# Patient Record
Sex: MALE | NOT HISPANIC OR LATINO | Employment: FULL TIME | URBAN - METROPOLITAN AREA
[De-identification: names, ages, dates, MRNs, and addresses within clinical notes are randomized per-mention and may not be internally consistent; named-entity substitution may affect disease eponyms.]

---

## 2024-02-11 ENCOUNTER — HOSPITAL ENCOUNTER (INPATIENT)
Facility: CLINIC | Age: 24
LOS: 3 days | Discharge: HOME | DRG: 638 | End: 2024-02-14
Attending: INTERNAL MEDICINE | Admitting: STUDENT IN AN ORGANIZED HEALTH CARE EDUCATION/TRAINING PROGRAM
Payer: COMMERCIAL

## 2024-02-11 DIAGNOSIS — E10.10 DIABETIC KETOACIDOSIS WITHOUT COMA ASSOCIATED WITH TYPE 1 DIABETES MELLITUS (H): ICD-10-CM

## 2024-02-11 DIAGNOSIS — E10.10 TYPE 1 DIABETES MELLITUS WITH KETOACIDOSIS WITHOUT COMA (H): Primary | ICD-10-CM

## 2024-02-11 PROBLEM — E11.10 DKA (DIABETIC KETOACIDOSIS) (H): Status: ACTIVE | Noted: 2024-02-11

## 2024-02-11 LAB
ALBUMIN SERPL BCG-MCNC: 4.2 G/DL (ref 3.5–5.2)
ALP SERPL-CCNC: 107 U/L (ref 40–150)
ALT SERPL W P-5'-P-CCNC: 23 U/L (ref 0–70)
ANION GAP SERPL CALCULATED.3IONS-SCNC: 10 MMOL/L (ref 7–15)
ANION GAP SERPL CALCULATED.3IONS-SCNC: 10 MMOL/L (ref 7–15)
ANION GAP SERPL CALCULATED.3IONS-SCNC: 14 MMOL/L (ref 7–15)
ANION GAP SERPL CALCULATED.3IONS-SCNC: 24 MMOL/L (ref 7–15)
ANION GAP SERPL CALCULATED.3IONS-SCNC: 28 MMOL/L (ref 7–15)
ANION GAP SERPL CALCULATED.3IONS-SCNC: 9 MMOL/L (ref 7–15)
AST SERPL W P-5'-P-CCNC: 16 U/L (ref 0–45)
B-OH-BUTYR SERPL-SCNC: 0.4 MMOL/L
B-OH-BUTYR SERPL-SCNC: 0.7 MMOL/L
B-OH-BUTYR SERPL-SCNC: 0.7 MMOL/L
B-OH-BUTYR SERPL-SCNC: 2 MMOL/L
B-OH-BUTYR SERPL-SCNC: 6 MMOL/L
B-OH-BUTYR SERPL-SCNC: 7.9 MMOL/L
BASE EXCESS BLDV CALC-SCNC: -13.1 MMOL/L (ref -3–3)
BASE EXCESS BLDV CALC-SCNC: -4 MMOL/L (ref -3–3)
BASOPHILS # BLD AUTO: ABNORMAL 10*3/UL
BASOPHILS # BLD MANUAL: 0 10E3/UL (ref 0–0.2)
BASOPHILS NFR BLD AUTO: ABNORMAL %
BASOPHILS NFR BLD MANUAL: 0 %
BILIRUB DIRECT SERPL-MCNC: <0.2 MG/DL (ref 0–0.3)
BILIRUB SERPL-MCNC: 0.5 MG/DL
BUN SERPL-MCNC: 24.3 MG/DL (ref 6–20)
BUN SERPL-MCNC: 27.4 MG/DL (ref 6–20)
BUN SERPL-MCNC: 27.5 MG/DL (ref 6–20)
BUN SERPL-MCNC: 29.6 MG/DL (ref 6–20)
BUN SERPL-MCNC: 34.6 MG/DL (ref 6–20)
BUN SERPL-MCNC: 38 MG/DL (ref 6–20)
CA-I BLD-MCNC: 4.9 MG/DL (ref 4.4–5.2)
CALCIUM SERPL-MCNC: 7.9 MG/DL (ref 8.6–10)
CALCIUM SERPL-MCNC: 8.1 MG/DL (ref 8.6–10)
CALCIUM SERPL-MCNC: 8.2 MG/DL (ref 8.6–10)
CALCIUM SERPL-MCNC: 8.3 MG/DL (ref 8.6–10)
CHLORIDE SERPL-SCNC: 102 MMOL/L (ref 98–107)
CHLORIDE SERPL-SCNC: 104 MMOL/L (ref 98–107)
CHLORIDE SERPL-SCNC: 105 MMOL/L (ref 98–107)
CHLORIDE SERPL-SCNC: 107 MMOL/L (ref 98–107)
CHLORIDE SERPL-SCNC: 91 MMOL/L (ref 98–107)
CHLORIDE SERPL-SCNC: 94 MMOL/L (ref 98–107)
CREAT SERPL-MCNC: 0.91 MG/DL (ref 0.67–1.17)
CREAT SERPL-MCNC: 0.97 MG/DL (ref 0.67–1.17)
CREAT SERPL-MCNC: 1.06 MG/DL (ref 0.67–1.17)
CREAT SERPL-MCNC: 1.08 MG/DL (ref 0.67–1.17)
CREAT SERPL-MCNC: 1.27 MG/DL (ref 0.67–1.17)
CREAT SERPL-MCNC: 1.31 MG/DL (ref 0.67–1.17)
DEPRECATED HCO3 PLAS-SCNC: 13 MMOL/L (ref 22–29)
DEPRECATED HCO3 PLAS-SCNC: 13 MMOL/L (ref 22–29)
DEPRECATED HCO3 PLAS-SCNC: 21 MMOL/L (ref 22–29)
DEPRECATED HCO3 PLAS-SCNC: 21 MMOL/L (ref 22–29)
DEPRECATED HCO3 PLAS-SCNC: 22 MMOL/L (ref 22–29)
DEPRECATED HCO3 PLAS-SCNC: 22 MMOL/L (ref 22–29)
EGFRCR SERPLBLD CKD-EPI 2021: 77 ML/MIN/1.73M2
EGFRCR SERPLBLD CKD-EPI 2021: 80 ML/MIN/1.73M2
EGFRCR SERPLBLD CKD-EPI 2021: >90 ML/MIN/1.73M2
EOSINOPHIL # BLD AUTO: ABNORMAL 10*3/UL
EOSINOPHIL # BLD MANUAL: 0 10E3/UL (ref 0–0.7)
EOSINOPHIL NFR BLD AUTO: ABNORMAL %
EOSINOPHIL NFR BLD MANUAL: 0 %
ERYTHROCYTE [DISTWIDTH] IN BLOOD BY AUTOMATED COUNT: 11.8 % (ref 10–15)
ERYTHROCYTE [DISTWIDTH] IN BLOOD BY AUTOMATED COUNT: 12.1 % (ref 10–15)
GLUCOSE BLDC GLUCOMTR-MCNC: 100 MG/DL (ref 70–99)
GLUCOSE BLDC GLUCOMTR-MCNC: 121 MG/DL (ref 70–99)
GLUCOSE BLDC GLUCOMTR-MCNC: 124 MG/DL (ref 70–99)
GLUCOSE BLDC GLUCOMTR-MCNC: 125 MG/DL (ref 70–99)
GLUCOSE BLDC GLUCOMTR-MCNC: 125 MG/DL (ref 70–99)
GLUCOSE BLDC GLUCOMTR-MCNC: 128 MG/DL (ref 70–99)
GLUCOSE BLDC GLUCOMTR-MCNC: 130 MG/DL (ref 70–99)
GLUCOSE BLDC GLUCOMTR-MCNC: 147 MG/DL (ref 70–99)
GLUCOSE BLDC GLUCOMTR-MCNC: 163 MG/DL (ref 70–99)
GLUCOSE BLDC GLUCOMTR-MCNC: 171 MG/DL (ref 70–99)
GLUCOSE BLDC GLUCOMTR-MCNC: 178 MG/DL (ref 70–99)
GLUCOSE BLDC GLUCOMTR-MCNC: 181 MG/DL (ref 70–99)
GLUCOSE BLDC GLUCOMTR-MCNC: 181 MG/DL (ref 70–99)
GLUCOSE BLDC GLUCOMTR-MCNC: 235 MG/DL (ref 70–99)
GLUCOSE BLDC GLUCOMTR-MCNC: 258 MG/DL (ref 70–99)
GLUCOSE BLDC GLUCOMTR-MCNC: 280 MG/DL (ref 70–99)
GLUCOSE BLDC GLUCOMTR-MCNC: 364 MG/DL (ref 70–99)
GLUCOSE BLDC GLUCOMTR-MCNC: 402 MG/DL (ref 70–99)
GLUCOSE BLDC GLUCOMTR-MCNC: 415 MG/DL (ref 70–99)
GLUCOSE BLDC GLUCOMTR-MCNC: 539 MG/DL (ref 70–99)
GLUCOSE BLDC GLUCOMTR-MCNC: 87 MG/DL (ref 70–99)
GLUCOSE SERPL-MCNC: 122 MG/DL (ref 70–99)
GLUCOSE SERPL-MCNC: 128 MG/DL (ref 70–99)
GLUCOSE SERPL-MCNC: 175 MG/DL (ref 70–99)
GLUCOSE SERPL-MCNC: 218 MG/DL (ref 70–99)
GLUCOSE SERPL-MCNC: 377 MG/DL (ref 70–99)
GLUCOSE SERPL-MCNC: 494 MG/DL (ref 70–99)
HBA1C MFR BLD: 10.4 %
HCO3 BLDV-SCNC: 14 MMOL/L (ref 21–28)
HCO3 BLDV-SCNC: 22 MMOL/L (ref 21–28)
HCT VFR BLD AUTO: 37.6 % (ref 40–53)
HCT VFR BLD AUTO: 41.1 % (ref 40–53)
HGB BLD-MCNC: 13.6 G/DL (ref 13.3–17.7)
HGB BLD-MCNC: 14.1 G/DL (ref 13.3–17.7)
IMM GRANULOCYTES # BLD: ABNORMAL 10*3/UL
IMM GRANULOCYTES NFR BLD: ABNORMAL %
LACTATE SERPL-SCNC: 2.1 MMOL/L (ref 0.7–2)
LYMPHOCYTES # BLD AUTO: ABNORMAL 10*3/UL
LYMPHOCYTES # BLD MANUAL: 1.5 10E3/UL (ref 0.8–5.3)
LYMPHOCYTES NFR BLD AUTO: ABNORMAL %
LYMPHOCYTES NFR BLD MANUAL: 5 %
MAGNESIUM SERPL-MCNC: 2.3 MG/DL (ref 1.7–2.3)
MCH RBC QN AUTO: 29.8 PG (ref 26.5–33)
MCH RBC QN AUTO: 30.1 PG (ref 26.5–33)
MCHC RBC AUTO-ENTMCNC: 34.3 G/DL (ref 31.5–36.5)
MCHC RBC AUTO-ENTMCNC: 36.2 G/DL (ref 31.5–36.5)
MCV RBC AUTO: 83 FL (ref 78–100)
MCV RBC AUTO: 87 FL (ref 78–100)
MONOCYTES # BLD AUTO: ABNORMAL 10*3/UL
MONOCYTES # BLD MANUAL: 2.5 10E3/UL (ref 0–1.3)
MONOCYTES NFR BLD AUTO: ABNORMAL %
MONOCYTES NFR BLD MANUAL: 8 %
MRSA DNA SPEC QL NAA+PROBE: NEGATIVE
NEUTROPHILS # BLD AUTO: ABNORMAL 10*3/UL
NEUTROPHILS # BLD MANUAL: 26.7 10E3/UL (ref 1.6–8.3)
NEUTROPHILS NFR BLD AUTO: ABNORMAL %
NEUTROPHILS NFR BLD MANUAL: 87 %
NRBC # BLD AUTO: 0 10E3/UL
NRBC BLD AUTO-RTO: 0 /100
O2/TOTAL GAS SETTING VFR VENT: 21 %
O2/TOTAL GAS SETTING VFR VENT: 21 %
OSMOLALITY SERPL: 322 MMOL/KG (ref 275–295)
OXYHGB MFR BLDV: 59 % (ref 70–75)
OXYHGB MFR BLDV: 73 % (ref 70–75)
PCO2 BLDV: 36 MM HG (ref 40–50)
PCO2 BLDV: 40 MM HG (ref 40–50)
PH BLDV: 7.2 [PH] (ref 7.32–7.43)
PH BLDV: 7.34 [PH] (ref 7.32–7.43)
PHOSPHATE SERPL-MCNC: 4.6 MG/DL (ref 2.5–4.5)
PLAT MORPH BLD: ABNORMAL
PLATELET # BLD AUTO: 210 10E3/UL (ref 150–450)
PLATELET # BLD AUTO: 227 10E3/UL (ref 150–450)
PO2 BLDV: 31 MM HG (ref 25–47)
PO2 BLDV: 37 MM HG (ref 25–47)
POTASSIUM SERPL-SCNC: 3.6 MMOL/L (ref 3.4–5.3)
POTASSIUM SERPL-SCNC: 3.6 MMOL/L (ref 3.4–5.3)
POTASSIUM SERPL-SCNC: 3.9 MMOL/L (ref 3.4–5.3)
POTASSIUM SERPL-SCNC: 4 MMOL/L (ref 3.4–5.3)
POTASSIUM SERPL-SCNC: 4.7 MMOL/L (ref 3.4–5.3)
POTASSIUM SERPL-SCNC: 5.1 MMOL/L (ref 3.4–5.3)
PROCALCITONIN SERPL IA-MCNC: 14.88 NG/ML
PROT SERPL-MCNC: 6.5 G/DL (ref 6.4–8.3)
RBC # BLD AUTO: 4.52 10E6/UL (ref 4.4–5.9)
RBC # BLD AUTO: 4.73 10E6/UL (ref 4.4–5.9)
RBC MORPH BLD: ABNORMAL
SA TARGET DNA: POSITIVE
SAO2 % BLDV: 60.4 % (ref 70–75)
SAO2 % BLDV: 74.8 % (ref 70–75)
SODIUM SERPL-SCNC: 131 MMOL/L (ref 135–145)
SODIUM SERPL-SCNC: 132 MMOL/L (ref 135–145)
SODIUM SERPL-SCNC: 136 MMOL/L (ref 135–145)
SODIUM SERPL-SCNC: 136 MMOL/L (ref 135–145)
SODIUM SERPL-SCNC: 137 MMOL/L (ref 135–145)
SODIUM SERPL-SCNC: 138 MMOL/L (ref 135–145)
WBC # BLD AUTO: 23.9 10E3/UL (ref 4–11)
WBC # BLD AUTO: 30.7 10E3/UL (ref 4–11)

## 2024-02-11 PROCEDURE — 83735 ASSAY OF MAGNESIUM: CPT

## 2024-02-11 PROCEDURE — 258N000002 HC RX IP 258 OP 250

## 2024-02-11 PROCEDURE — 80048 BASIC METABOLIC PNL TOTAL CA: CPT

## 2024-02-11 PROCEDURE — 200N000002 HC R&B ICU UMMC

## 2024-02-11 PROCEDURE — 99291 CRITICAL CARE FIRST HOUR: CPT

## 2024-02-11 PROCEDURE — 36415 COLL VENOUS BLD VENIPUNCTURE: CPT

## 2024-02-11 PROCEDURE — 93005 ELECTROCARDIOGRAM TRACING: CPT

## 2024-02-11 PROCEDURE — 87640 STAPH A DNA AMP PROBE: CPT

## 2024-02-11 PROCEDURE — 85007 BL SMEAR W/DIFF WBC COUNT: CPT

## 2024-02-11 PROCEDURE — 83605 ASSAY OF LACTIC ACID: CPT

## 2024-02-11 PROCEDURE — 82330 ASSAY OF CALCIUM: CPT

## 2024-02-11 PROCEDURE — 93010 ELECTROCARDIOGRAM REPORT: CPT | Performed by: INTERNAL MEDICINE

## 2024-02-11 PROCEDURE — 80053 COMPREHEN METABOLIC PANEL: CPT

## 2024-02-11 PROCEDURE — 87641 MR-STAPH DNA AMP PROBE: CPT

## 2024-02-11 PROCEDURE — 250N000012 HC RX MED GY IP 250 OP 636 PS 637: Performed by: PHYSICIAN ASSISTANT

## 2024-02-11 PROCEDURE — 82805 BLOOD GASES W/O2 SATURATION: CPT

## 2024-02-11 PROCEDURE — 99223 1ST HOSP IP/OBS HIGH 75: CPT | Performed by: PHYSICIAN ASSISTANT

## 2024-02-11 PROCEDURE — 82010 KETONE BODYS QUAN: CPT

## 2024-02-11 PROCEDURE — 85027 COMPLETE CBC AUTOMATED: CPT

## 2024-02-11 PROCEDURE — 258N000003 HC RX IP 258 OP 636

## 2024-02-11 PROCEDURE — 250N000011 HC RX IP 250 OP 636

## 2024-02-11 PROCEDURE — 83930 ASSAY OF BLOOD OSMOLALITY: CPT

## 2024-02-11 PROCEDURE — 250N000009 HC RX 250

## 2024-02-11 PROCEDURE — 83036 HEMOGLOBIN GLYCOSYLATED A1C: CPT

## 2024-02-11 PROCEDURE — 84100 ASSAY OF PHOSPHORUS: CPT

## 2024-02-11 PROCEDURE — 87040 BLOOD CULTURE FOR BACTERIA: CPT

## 2024-02-11 PROCEDURE — 84145 PROCALCITONIN (PCT): CPT

## 2024-02-11 RX ORDER — PROCHLORPERAZINE MALEATE 5 MG
10 TABLET ORAL EVERY 6 HOURS PRN
Status: DISCONTINUED | OUTPATIENT
Start: 2024-02-11 | End: 2024-02-14 | Stop reason: HOSPADM

## 2024-02-11 RX ORDER — ONDANSETRON 2 MG/ML
4 INJECTION INTRAMUSCULAR; INTRAVENOUS EVERY 6 HOURS PRN
Status: DISCONTINUED | OUTPATIENT
Start: 2024-02-11 | End: 2024-02-14 | Stop reason: HOSPADM

## 2024-02-11 RX ORDER — NICOTINE POLACRILEX 4 MG
15-30 LOZENGE BUCCAL
Status: DISCONTINUED | OUTPATIENT
Start: 2024-02-11 | End: 2024-02-12

## 2024-02-11 RX ORDER — PROCHLORPERAZINE 25 MG
25 SUPPOSITORY, RECTAL RECTAL EVERY 12 HOURS PRN
Status: DISCONTINUED | OUTPATIENT
Start: 2024-02-11 | End: 2024-02-14 | Stop reason: HOSPADM

## 2024-02-11 RX ORDER — HEPARIN SODIUM 5000 [USP'U]/.5ML
5000 INJECTION, SOLUTION INTRAVENOUS; SUBCUTANEOUS EVERY 12 HOURS
Status: DISCONTINUED | OUTPATIENT
Start: 2024-02-11 | End: 2024-02-12

## 2024-02-11 RX ORDER — DEXTROSE MONOHYDRATE 25 G/50ML
25-50 INJECTION, SOLUTION INTRAVENOUS
Status: DISCONTINUED | OUTPATIENT
Start: 2024-02-11 | End: 2024-02-12

## 2024-02-11 RX ORDER — CEFEPIME HYDROCHLORIDE 1 G/1
1 INJECTION, POWDER, FOR SOLUTION INTRAMUSCULAR; INTRAVENOUS EVERY 12 HOURS
Status: DISCONTINUED | OUTPATIENT
Start: 2024-02-11 | End: 2024-02-12

## 2024-02-11 RX ORDER — SODIUM CHLORIDE 450 MG/100ML
INJECTION, SOLUTION INTRAVENOUS CONTINUOUS
Status: DISCONTINUED | OUTPATIENT
Start: 2024-02-11 | End: 2024-02-12

## 2024-02-11 RX ORDER — ONDANSETRON 4 MG/1
4 TABLET, ORALLY DISINTEGRATING ORAL EVERY 6 HOURS PRN
Status: DISCONTINUED | OUTPATIENT
Start: 2024-02-11 | End: 2024-02-14 | Stop reason: HOSPADM

## 2024-02-11 RX ADMIN — DEXTROSE AND SODIUM CHLORIDE: 5; 450 INJECTION, SOLUTION INTRAVENOUS at 09:27

## 2024-02-11 RX ADMIN — HEPARIN SODIUM 5000 UNITS: 5000 INJECTION, SOLUTION INTRAVENOUS; SUBCUTANEOUS at 11:17

## 2024-02-11 RX ADMIN — CEFEPIME 1 G: 1 INJECTION, POWDER, FOR SOLUTION INTRAMUSCULAR; INTRAVENOUS at 13:16

## 2024-02-11 RX ADMIN — SODIUM CHLORIDE 100 ML/HR: 4.5 INJECTION, SOLUTION INTRAVENOUS at 04:32

## 2024-02-11 RX ADMIN — DEXTROSE AND SODIUM CHLORIDE: 5; 450 INJECTION, SOLUTION INTRAVENOUS at 19:59

## 2024-02-11 RX ADMIN — SODIUM CHLORIDE 1000 ML: 9 INJECTION, SOLUTION INTRAVENOUS at 03:44

## 2024-02-11 RX ADMIN — INSULIN GLARGINE 25 UNITS: 100 INJECTION, SOLUTION SUBCUTANEOUS at 16:01

## 2024-02-11 RX ADMIN — INSULIN HUMAN 5.5 UNITS/HR: 1 INJECTION, SOLUTION INTRAVENOUS at 03:46

## 2024-02-11 RX ADMIN — INSULIN ASPART 3 UNITS: 100 INJECTION, SOLUTION INTRAVENOUS; SUBCUTANEOUS at 18:51

## 2024-02-11 RX ADMIN — INSULIN HUMAN 8 UNITS/HR: 1 INJECTION, SOLUTION INTRAVENOUS at 15:56

## 2024-02-11 RX ADMIN — INSULIN ASPART 4 UNITS: 100 INJECTION, SOLUTION INTRAVENOUS; SUBCUTANEOUS at 14:21

## 2024-02-11 ASSESSMENT — ACTIVITIES OF DAILY LIVING (ADL)
ADLS_ACUITY_SCORE: 19
ADLS_ACUITY_SCORE: 18
ADLS_ACUITY_SCORE: 19
ADLS_ACUITY_SCORE: 19
ADLS_ACUITY_SCORE: 18

## 2024-02-11 NOTE — H&P
[facility] ICU H&P  02/11/2024    Date of Hospital Admission: 02/11/2024  Date of ICU Admission: 02/11/2024   Reason for Critical Care Admission: DKA  Date of Service (when I saw the patient): 02/11/2024    ASSESSMENT: Amaury Healy is a 24 year old male with a medical history significant for DM type 1 who was admitted on 2/11/2024 for DKA after presenting to the ED reporting a one day history of nausea and vomiting.      PLAN:    Neuro:  # No concerns  ~ Monitor neuro status    Pulmonary:  # No concerns  ~ SpO2 > 92%  ~ Supplemental oxygen as needed    Cardiovascular:  # Tachycardia  ~ Secondary to DKA  ~ Cardiac monitoring  ~ MAP > 65    GI/Nutrition:  # Nausea + Vomiting  ~ NPO  ~ Zofran available for nausea, though this has improved    Renal/Fluids/Electrolytes:  # AGMA  # PUMA  Cr was 1.31 on arrival to ICU  K+ was initially 6.5 with a corrected sodium of 141.  pH of 7.2 and bicarb of 13 on arrival to ICU, received 2 amps of bicarb in the ED for a pH of 6.88 and bicarb < 5  ~ Daily weights  ~ Monitor I&O  ~ replace electrolytes as needed  ~ BMP every 4 hours  ~ Check VBG and lactic acid    Endocrine:  # DM, type 1  # DKA  Most recent A1C was 9.4 n 10/2020  A1C now 10.9 on arrival to ICU  Has implanted pump and glucometer (currently off)  AG of 28, BG of 494, Ketones of 7.9   Received 3 L of fluid in the ED  ~ Endocrinology consulted  ~ Insulin infusion, DKA algorithm  ~ 1/2NS infusion at 100 mL per hour  ~ change to D5 1/2NS at 100 mL per hour after BG < 200  ~ Ketones every 4 hours      ID:  # Leukocytosis  WBCs of 30.7 on arrival to ICU  No fevers or signs of infection  CT Abdomen + Chest in the ED (2/10): No acute findings, no concerns for infection  ~ Monitor Blood cultures  ~ respiratory viral panel negative in the ED  ~ UA was bland    Hematology:    # No concerns  ~ Hgb 14.1  ~ No signs of bleeding    Musculoskeletal:  # No concerns`  ~ ambulate with assist     Skin:  # No concerns  ~ Diligent nursing  "cares    General Cares/Prophylaxis:    DVT Prophylaxis: Pneumatic Compression Devices  GI Prophylaxis: Not indicated  Restraints: Not Indicated  Family Communication: Per patient  Code Status: Full Code    Lines/tubes/drains:  - PIV x2    Dis[facility]:  - [facility]     [facility]  Pager #[phone]  Critical Care  [facility]     -----------------------------------------------------------------------    HISTORY PRESENTING ILLNESS: Per ED: \"Amaury Healy is a 24 year old male with history of type 1 diabetes on insulin pump here for evaluation of hyperglycemia. Patient states that around noon today he started to feel some nausea, vomiting and generalized malaise. Checked his blood pressure later this evening and it was around 500. He reportedly has a functional insulin pump and this has been providing him his basal insulin without any concerns. He gave 5 units of short acting insulin about 2 hours ago. He did not have any recent illness such as fevers, cough, headache, abdominal pain. Patient has no history of DKA per his report.\"      REVIEW OF SYSTEMS:  ROS: 10 point ROS neg other than the symptoms noted above in the HPI.     PAST MEDICAL HISTORY:   No past medical history on file.  SURGICAL HISTORY:  No past surgical history on file.  SOCIAL HISTORY:  Social History     Socioeconomic History    Marital status: Single     FAMILY HISTORY:   No family history on file.  ALLERGIES:   No Known Allergies  MEDICATIONS:  No current facility-administered medications on file prior to encounter.  No current outpatient medications on file prior to encounter.      Physical Exam  Vitals reviewed.   Constitutional:       General: He is sleeping.      Appearance: Normal appearance.   HENT:      Head: Normocephalic.      Mouth/Throat:      Mouth: Mucous membranes are moist.      Pharynx: Oropharynx is clear.   Eyes:      Extraocular Movements: Extraocular movements intact.      " Conjunctiva/sclera: Conjunctivae normal.      Pupils: Pupils are equal, round, and reactive to light.   Cardiovascular:      Rate and Rhythm: Tachycardia present.      Pulses: Normal pulses.      Heart sounds: Normal heart sounds.   Pulmonary:      Effort: Pulmonary effort is normal.      Breath sounds: Normal breath sounds.   Abdominal:      General: There is no distension.      Palpations: Abdomen is soft.      Tenderness: There is no abdominal tenderness.   Musculoskeletal:         General: Normal range of motion.      Cervical back: Normal range of motion.   Skin:     General: Skin is warm and dry.   Neurological:      General: No focal deficit present.      Mental Status: He is oriented to person, place, and time and easily aroused.   Psychiatric:         Behavior: Behavior normal.            LABS: Reviewed.   Arterial Blood Gases   No lab results found in last 7 days.  Complete Blood Count   Recent Labs   Lab 02/11/24  0351   WBC 30.7*   HGB 14.1        Basic Metabolic Panel  Recent Labs   Lab 02/11/24  0439 02/11/24  0351 02/11/24  0349 02/11/24  0327   NA  --  132*  --   --    POTASSIUM  --  5.1  --   --    CHLORIDE  --  91*  --   --    CO2  --  13*  --   --    BUN  --  38.0*  --   --    CR  --  1.31*  --   --    * 494* 415* 539*     Liver Function Tests  Recent Labs   Lab 02/11/24  0351   AST 16   ALT 23   ALKPHOS 107   BILITOTAL 0.5   ALBUMIN 4.2     Coagulation Profile  No lab results found in last 7 days.    IMAGING:  Recent Results (from the past 24 hour(s))   CT CHEST ABD WO IV CONTRAST    Narrative    For Patients:  As a result of the 21st Century Cures Act, medical imaging exams  and procedure reports are released immediately into your electronic medical  record.  You may view this report before your referring provider.  If you have  questions, please contact your health care provider.      INDICATION:  Leukocytosis, high blood sugar     TECHNIQUE:  CT chest, abdomen and pelvis acquired  without contrast.    COMPARISON:  None.    FINDINGS:  CHEST  Lungs: No focal consolidation. No pneumothorax. No effusion.   Mediastinum: Unremarkable.   Lymph nodes: No gross lymphadenopathy.   Soft tissues: Unremarkable.   Bones: Unremarkable.     ABDOMEN AND PELVIS  Hepatobiliary: No acute abnormality appreciated.   Spleen: Unremarkable.   Pancreas: Unremarkable.   Adrenal glands: Unremarkable.   Kidneys: Simple cyst left kidney. No significant parenchymal abnormality. No  calculi. No hydronephrosis.   Bowel: No obstruction. No focal perienteric or pericolonic stranding is  appreciated. The appendix is visualized and appears unremarkable.   Vascular: Not well evaluated on this noncontrast examination. Scattered areas of  arterial calcification.   Lymph nodes: No gross lymphadenopathy.   Peritoneum: No free air. No free fluid.   : Moderate-to-large bladder volume, otherwise unremarkable.   Soft tissues: Unremarkable.   Bones: Unremarkable.     IMPRESSION:  No abnormal findings appreciated to explain patient`s reported symptoms.      Please note that all CT scans at this facility use dose modulation, iterative  reconstruction, and/or weight-based dosing when appropriate to reduce radiation  dose to as low as reasonably achievable.    Dictated by Adan Velazquez MD @ 2/11/2024 2:04:10 AM    Electronically Signed

## 2024-02-11 NOTE — PLAN OF CARE
Goal Outcome Evaluation     ICU End of Shift Summary:     -At 0330 Received patient from [facility] ED admitted in ICU due to DKA.  -DKA protocol initiated as ordered.   -12 lead EKG completed  -At 0445- Critical lab result on Ketone 7.90- SAADIA Mick informed.  -Latest blood glucose 280 mg/dl at 0645am-insulin at 11 units/hr-algorithm 2 and 0.45% sodium chloride at 100 ml/hr  -AM labs collected    Neuro: alert and oriented x 4, denies any pain, PERRLA, able to move all extremities.  Pulm/Resp: clear breath sounds, on room air, denies any shortness of breath.  CV: sinus tachycardia, normotensive MAP >65  GI: NPO except ice chips. Denies nausea and vomiting. Abdomen is soft and non-tender.  : able to void via urinal, had 1,400 ml of clear, yellow urine.   Access: peripheral IV x 2 (right and left antecubital)  Skin: scab on left elbow and abrasion on back  Gtts: Insulin infusion 11 units/hr- algorithm 2 and 0.45% sodium chloride at 100 ml/hr  Drains: none    Plan:  Continue DKA protocol

## 2024-02-11 NOTE — CONSULTS
Inpatient Diabetes Management Service : New Consult Note     Patient: Amaury Healy   YOB: 2000    MRN: [id number]     Date of Consult : 02/11/2024   Date of Admission: 2/11/2024     Reason for Consult: DKA   Consult Requestor: Mick Calderon NP            History of Present Illness:     History obtained via the patient, chart review and discussion with primary team.     Additional Historian:  Girl friend is present   No  needed    Amaury Healy is a 24 year old man with history of type 1 diabetes on insulin pump here for evaluation of hyperglycemia and was found to be in DKA. Patient states that around noon on Friday he was skate boarding after eating [employer] and developed nausea. He only took 1/2 of his normal bolus with his pump because he was skate boarding.  On Saturday he still did not feel well.  He ate s small amount of breakfast that he vomited up,  He checked his blood glucose and it was 552.  He gave himself a bolus with his pump and brought his BG down to 540 and then 400 and then 300.  He then had emesis x2 and felt warm. He denies polyuria but was thirsty. He also had nausea and general malaise. He thinks his pump was functioning because his BG went from 552 to 540 to 400 to 300. Not sure if he looked at the infusion site and does not remember when he last changed his infusion set.  He denies recent illness such as fevers, cough, cold, abdominal pain, infected tooth, sores on feet, no healing sores,dysuria. Patient has no history of DKA.     Diabetes Focus:   Amaury Healy is not known to the inpatient diabetes service.  He last saw Dr Emanuel Ann on 6/1/2023, a pediatric endocrinologist..  At that time it was recommended that he upgrade to a new pump, Medtronic 780G.  Amaury does not think this is a new pump.  He does not use a guardian sensor due to it keeps coming off at work. He says he has not made any pump changes since that visit.  He checks his BG  sometimes once or twice daily. He gets a low maybe once per week and eats what ever carbs are handy in his lunch. He does not always check his BG if he gets cold or sweaty, just eats some carbs. He usually gets low at work during his swing shift.  He says if he check his BG fasting runs 150-200/. He says sometimes he is high 200-300. He changes his infusion set maybe every 3-4 days.  He came to to the ED with his girlfriend and found to be in DKA.  Labs are as below.       Last dose insulin PTA:  Had given himself a bolus he thinks of 5 units before they asked him to remove his pump when he arrived here.       BG//Labs at time of consult: RE=855, TC02=13, creat=1.31, AG=28, ketome=7.90, VBG ph=7.20    Other Active/Contributory Medical Problems: none   Planned Procedures/Surgeries: none        GAD65 antibody, islet antibody, insulin antibody, and c-peptide  not available on epic search     Inpatient Glucose Trends:               Diabetes History:     Diabetes Type and Duration: 1 since he was 9 years old    Complications: no known complications.  Denies numbness or tingling, denies retinopathy but no eyes exam for a long time, denies neprhopathy( no microalbumin found in EPIC) no history of stroke, heart disease or amputation   Last A1c: 11/2021=10.9 ( found in note)   and 2/11/2024 A1c=10.4     Hemoglobin at time of last A1c: hgb=13.6 on 2/11/2024  RBC transfusion in past 3 months:  none    Hypoglycemia PTA:   - Frequency: maybe once per week, usually when he is at work( works swing shift 2-10 pm) when he does not eat  - Severity: gets shaky with cold sweat  - Awareness:  intact    Medic Alert if Type 1: no  History of DKA: no, not before this admission for DKA    Safety Kit:   - Glucagon: no   - Ketone Strips: no    Outpatient Diabetes Provider: last saw Dr Emanuel Ann on 6/1/2024   Formal Diabetes Education/Educator: Michelle RICHEY    Diet/Lifestyle: He is a , works swing shift, if he is going to exercise  like skateboard then he bolus 1/2 of what he would otherwise    Recent weight change:  no  Ability to [place] Prescribed Regimen:  good   Food/Housing Insecurity: no            PTA Regimen:     Diabetes Medications: none    Historical Diabetes Medications: insulin basal bolus until he was 12 and then he started a pump    BG monitoring device & frequency:  fingers sticks--maybe once or twice daily    BG trends at home: Lows maybe once per week at work during swing shift if he does not eat but does not always check BG when feels low     Amb pump:    Girl friend has pump.  She will bring several infusion sets with her tomorrow  Pump type: Metronic 770 Minmed per chart, cannot see model on pump    Pump insulin: Humalog    Basal rates:   12 am to 2:59 am--> 1.50  3 am to 11:59 am--> 1.60    12 pm to 23:59--> 1.65    Total Basal=38.7 units/24 hours    Insulin to carb ratio  12 am to 4:59 am-->1/8  5 am to 10:59 am 1/8  11:00 am to 2:59 pm--> 1/8   3 pm to 23:59--> 1/8    ISF/sensitivity  12 am to 07:59 am--> 1/35  8 am to 23:59 1/30       BG Target: 100-150  from 12 am to 4:49 am,   5 am to 8:29 pm    8:30 pm to 23:12=296-455   Active insulin time: 3 hours          Medications Impacting Glycemia:      Steroids: none  D5W containing solutions/medications: D5 1/2 down to 75 ml/hour    Other medications impacting glucose:  none         Diet/Nutrition:      Orders Placed This Encounter      Moderate Consistent Carb (60 g CHO per Meal) Diet       Supplements:  none  TF: none  TPN: none          Review of Systems:    See HPI:  n,v,  malaise, thirsty, no poly uria, no abdominal pain, no painful teeth, n cuts or scrapes, no sores on feet           Past Medical History:       ADHD  Iron deficiency anemia         Past Surgical History:      No past surgical history on file.          Social History:      Social History     Tobacco Use    Smoking status: Not on file    Smokeless tobacco: Not on file   Substance Use Topics     "Alcohol use: Not on file        History   Sexual Activity    Sexual activity: Not on file        Tobacco: no but vapes    Etoh: sometimes    Other Substance: says no    Marital Status: single, has girl friend    Lives with mom and da in         Family History:      Reviewed and non-contributory.    Family History of Diabetes: Patient is adopted from [place]    No family history on file.          Physical Exam:      /63   Pulse 90   Temp 98.1  F (36.7  C) (Axillary)   Resp 15   Ht 1.803 m (5' 11\")   Wt 82.8 kg (182 lb 8.7 oz)   SpO2 100%   BMI 25.46 kg/m     General:  in no distress , laying in bed, girlfriend is at bedside  HEENT: normocephalic, atraumatic. Oral mucous membranes dry   Lungs: unlabored respiration, no cough, remains on room air  ABD: non distended, soft  Skin: warm and dry,  MSK:  moves all extremities  Lymp:  no LE edema   Mental status:  alert, oriented to self, place, time  Psych:  calm and cooperative            Laboratory Data:      Lab Results   Component Value Date    A1C 10.4 02/11/2024       ROUTINE IP LABS (Last four results)  BMP  Recent Labs   Lab 02/11/24  1500 02/11/24  1401 02/11/24  1305 02/11/24  1211 02/11/24  1115 02/11/24  1110 02/11/24  0921 02/11/24  0920 02/11/24  0646 02/11/24  0610 02/11/24  0439 02/11/24  0351   NA  --   --   --   --   --  136  --  137  --  131*  --  132*   POTASSIUM  --   --   --   --   --  3.9  --  4.0  --  4.7  --  5.1   CHLORIDE  --   --   --   --   --  104  --  102  --  94*  --  91*   MARY  --   --   --   --   --  8.2*  --  8.3*  --  7.9*  --  8.3*   CO2  --   --   --   --   --  22  --  21*  --  13*  --  13*   BUN  --   --   --   --   --  27.5*  --  29.6*  --  34.6*  --  38.0*   CR  --   --   --   --   --  0.97  --  1.06  --  1.27*  --  1.31*   * 128* 124* 125*   < > 175*   < > 218*   < > 377*   < > 494*    < > = values in this interval not displayed.     CBC  Recent Labs   Lab 02/11/24  1110 02/11/24  0351   WBC 23.9* 30.7*   RBC 4.52 " "4.73   HGB 13.6 14.1   HCT 37.6* 41.1   MCV 83 87   MCH 30.1 29.8   MCHC 36.2 34.3   RDW 12.1 11.8    227     INRNo lab results found in last 7 days.  Lab Results   Component Value Date    AST 16 02/11/2024     Lab Results   Component Value Date    ALT 23 02/11/2024     No results found for: \"BILICONJ\"   Lab Results   Component Value Date    BILITOTAL 0.5 02/11/2024     Lab Results   Component Value Date    ALBUMIN 4.2 02/11/2024     Lab Results   Component Value Date    PROTTOTAL 6.5 02/11/2024      Lab Results   Component Value Date    ALKPHOS 107 02/11/2024            Assessment and Recommendations:       Assessment:   Type 1 Diabetes Mellitus, uses ambulatory pump, no known complications, poor control  (A1c 10.4 %) on 2/11/2024  DKA, unclear etiology      Plan/Recommendations:      -Continue DKA insulin drip  -Continue labs as per DKA protocol  -Give Lantus  25 units every day when arrives from pharmacy and continue DKA insulin drip, this will help bring drip rate down  -Allow patient to eat and decrease dextrose fluid   - Novolog insulin carbohydrate Coverage: 1 units per 8 g CHO, with meals and PRN with snacks/supplements   - Novolog Correction Scale: on Hold while on drip  - BG monitoring:  Every hour as per insulin   - Hypoglycemia protocol    - Carb counting protocol   - CDE consult today to make sure pump is running.  Girl friend to bring infusion set  Discharge Planning: (tentative)    Medications: TBD    Test Claims: not yet  Education:  Needs to be assessed closer to discharge. Pump assessment and learn how to check ketones in urine.  Give insulin by pen/syringe to prevent DKA if pump should not be working.   Outpatient Follow-up: Endocrinologist Dr Emanuel Ann    Planned procedures/surgeries: none   ELOS:  TBD      Discussion:   Unclear etiology of DKA.  Has never been in DKA before. No infectious sx and no infection found yet, BC pending.  Procal is elevated as is WBC of unknown etiology.  Was " pump working?, unclear.  Will have CNS/CDE look at pump and see if working.  Girl Friend has pump for now.   Reviewed settings.  Educated patient when on a pump to have back up Novolog, give 5-6 units by pen/syringe to take pump out of the equation.  Does not know how to check ketones in urine.         Thank you for this consult. IDS will continue to follow.      Please notify Inpatient Diabetes Service if changes are planned to steroids, nutrition, TPN/TF and anticipated procedures requiring prolonged NPO status.     Angie La PA-C  Inpatient Diabetes Service  Pager   [phone]  Available by [email or web address]  Date of Service:2/11/2024    To contact Inpatient Diabetes Service:     7 AM - 5 PM: Page the IDS SAADIA following the patient that day (see filed or incomplete progress notes/consult notes under Endocrinology)    OR if uncertain of provider assignment: page job code [phone]    5 PM - 7 AM: First call after hours is to primary service.    For urgent after-hours questions, page job code for on call fellow: [phone]     I spent a total of 80 minutes on the date of the encounter doing prep/post-work, chart review, history and exam, documentation and further activities per the note including lab review, multidisciplinary communication, counseling the patient and/or coordinating care regarding acute hyper/hypoglycemic management, as well as discharge management and planning/communication.  See note for details.

## 2024-02-11 NOTE — PROGRESS NOTES
Admitted/transferred from: [facility] ED  Reason for admission/transfer: DKA  2 RN skin assessment: completed by Betty RN and Jessica HUGHES  Result of skin assessment and interventions/actions: scabs on left elbow, abrasion on back  Height, weight, drug calc weight: Done  Patient belongings (see Flowsheet)  MDRO education added to care planN/A  ?

## 2024-02-11 NOTE — PLAN OF CARE
Major shift changes:  Endocrine consulted  Advanced diet to regular; tolerating well  25 unit(s) Lantus administered at 1600 per endocrinology; continuing insulin gtt.  WBC and procal elevated; B Cx obtained  Cefepime q12h  Neuro: A&Ox4, T max 99.0. Denies pain  Cardiac: NSR to ST, normotensive  Respiratory: >90% on RA. LSC, no cough  GI: LBM 2/11; +fl. Tolerating consistent carb diet  : Voiding adequately via urinal  Skin: Scabs on L elbow and bilateral shins, small abrasion on back  LDA: R and L PIVs  Plan: TBD  Drips: Insulin: currently off   D5 0.45% NaCl @ 75mL/hr  For vital signs and complete assessments, please see documentation flowsheets.

## 2024-02-11 NOTE — PROGRESS NOTES
[facility] ICU PROGRESS NOTE  02/11/2024      Date of Service (when I saw the patient): 02/11/2024    ASSESSMENT: Amaury Healy is a 24 year old male with a medical history significant for DM type 1 who was admitted on 2/11/2024 for DKA after presenting to the ED reporting a one day history of nausea and vomiting.     CHANGES and MAJOR THINGS TODAY:   - Procalcitonin added to AM labs  - Repeat CBC at noon  - Heparin for DVT ppx  - Cefepime for increased procal and WBCs  - Blood cultures and MRSA swab ordered  - Diet ordered      PLAN:    Neuro:  # No acute concerns  - Monitor neuro status       Pulmonary:  # No acute concerns  - Maintain oxygen saturation greater than 92% and titrate FiO2 to maintain saturation goal       Cardiovascular:  # Tachycardia  Likely s/t DKA. Patient otherwise hemodynamically intact.  - Continuous cardiac monitoring while in ICU  - MAP goal greater than 65 mmHg       GI/Nutrition:  # Nausea   # Vomiting  Patient stated that he felt nauseous with vomiting and malaise around noon on 02/10, but reports no other GI symptoms, denies recent illness, and denies abdominal pain  - Zofran 4mg q6 hrs PRN  - Compazine 10 mg q6 hrs PRN    Diet: 60 gram CHO diet       Renal/Fluids/Electrolytes:  # AGMA  # PUMA  # Hypocalcemia  Cr was 1.31 on arrival to ICU, K+ was initially 6.5 with a corrected sodium of 141, pH of 7.2 and bicarb of 13 on arrival to ICU, received 2 amps of bicarb in the ED for a pH of 6.88 and bicarb < 5.  - Daily weights while in ICU  - Monitor I&O  - Electrolyte replacement protocols ordered  - BMP q4 hrs       Endocrine:  # DM, type 1  # DKA  Most recent A1C was 9.4 n 10/2020, A1C now 10.9 on arrival to ICU. Patient has implanted pump and glucometer (currently off). Initial AG of 28, BG of 494, Ketones of 7.9, and received 3 L of fluid in the ED  - Endocrinology consulted and appreciate recommendations  - Insulin infusion, DKA algorithm  - 1/2NS infusion at 100 mL per hour  - Expect  to switch to D5 1/2NS at 100 mL per hour after BG < 200  - Ketone levels q4 hrs        ID:  # Query gastroenteritis  WBCs of 30.7 on arrival to ICU with no fevers or signs of infection.  - CT Abdomen + Chest in the ED (2/10): No acute findings, no concerns for infection  - Obtain blood, urine, and sputum cultures if patient temperature less than 96.0F, or greater than 101.5F  - respiratory viral panel negative in the ED  - UA was bland  - No indication for abx at this time  - Procalcitonin level at 14.88 on 02/11, will repeat in 48 hours  - Patient received vancomycin and cefepime in ED  - Blood cultures on 02/11  - Cefepime 1g BID x48 hrs and started for increased procalcitonin and WBCs, in spite of no clear sources of infectious process  - CT chest and abdomen done in ED on 02/11 and showing no abnormal findings to support patient's reported symptoms  - MRSA swab sent on 02/11       Hematology:    # Leukocytosis  - Trend fever and WBC curve  - Transfuse if hemoglobin less than 7.0  - CBC qday  - Heparin 5000 units subcutaneous BID for DVT ppx       Musculoskeletal:  # No acute concerns  - Ambulate with assist       Skin:  # No acute concerns  - Appreciate diligent nursing cares      General Cares/Prophylaxis:    DVT Prophylaxis: Heparin SQ  GI Prophylaxis: Not indicated  Restraints: Not indicated  Family Communication: Family called via telephone  Code Status: Full Code    Lines/tubes/drains:  - PIVs    Disposition:  -  ICU    Patient seen and findings/plan discussed with medical ICU staff, Dr. Kent.    I spent a total of 55 minutes (excluding procedure time) personally providing and directing critical care services at the bedside and on the critical care unit for GAUDENCIO Ken CNP    Attending note:  Patient seen, examined and discussed with the SAADIA.  All data reviewed including vital signs, medications, laboratory studies and imaging.  I agree with the above assessment and plan.  History  "of Type 1 diabetes admitted with DKA, ? Pump malfunction.  Markedly elevated WBC and increased pro-calcitonin, will obtain blood cultures, UA negative, no localizing sites of infection; continue emperic antibiotics. Endocrinology to evaluate.    Clinically Significant Risk Factors Present on Admission          # Hypocalcemia: Lowest Ca = 7.9 mg/dL in last 2 days, will monitor and replace as appropriate    # Anion Gap Metabolic Acidosis: Highest Anion Gap = 28 mmol/L in last 2 days, will monitor and treat as appropriate          # DMII: A1C = 10.4 % (Ref range: <5.7 %) within past 6 months    # Overweight: Estimated body mass index is 25.46 kg/m  as calculated from the following:    Height as of this encounter: 1.803 m (5' 11\").    Weight as of this encounter: 82.8 kg (182 lb 8.7 oz).                  ====================================  INTERVAL HISTORY:   Patient admitted to ICU overnight for DKA. Patient hemodynamically stable overnight, on room air, and able to state needs coherently. Plan to keep in ICU through remainder of the day and plan to transfer to Med/Surg tomorrow if patient continues to be stable vitally and lab-wise.    OBJECTIVE:   1. VITAL SIGNS:   Temp:  [98  F (36.7  C)-99  F (37.2  C)] 98.1  F (36.7  C)  Pulse:  [102-114] 102  Resp:  [15-23] 15  BP: (107-119)/(57-67) 111/62  SpO2:  [95 %-100 %] 96 %  Resp: 15    2. INTAKE/ OUTPUT:   I/O last 3 completed shifts:  In: 1160.33 [I.V.:160.33; IV Piggyback:1000]  Out: 1400 [Urine:1400]    3. PHYSICAL EXAMINATION:  Physical Exam  Vitals and nursing note reviewed.   Constitutional:       General: He is not in acute distress.     Appearance: Normal appearance. He is well-developed. He is not ill-appearing.   HENT:      Head: Normocephalic and atraumatic.      Mouth/Throat:      Mouth: Mucous membranes are moist.      Pharynx: Oropharynx is clear.   Eyes:      Extraocular Movements: Extraocular movements intact.      Conjunctiva/sclera: Conjunctivae " normal.      Pupils: Pupils are equal, round, and reactive to light.   Cardiovascular:      Rate and Rhythm: Regular rhythm. Tachycardia present.      Pulses: Normal pulses.      Heart sounds: Normal heart sounds. No murmur heard.     No friction rub. No gallop.   Pulmonary:      Effort: Pulmonary effort is normal. No respiratory distress.      Breath sounds: Normal breath sounds. No stridor. No wheezing, rhonchi or rales.   Abdominal:      General: Abdomen is flat. Bowel sounds are normal. There is no distension.      Palpations: Abdomen is soft.      Tenderness: There is no abdominal tenderness.   Musculoskeletal:      Cervical back: Normal range of motion.      Right lower leg: No edema.      Left lower leg: No edema.   Skin:     General: Skin is warm and dry.      Capillary Refill: Capillary refill takes less than 2 seconds.   Neurological:      General: No focal deficit present.      Mental Status: He is alert and oriented to person, place, and time.      GCS: GCS eye subscore is 4. GCS verbal subscore is 5. GCS motor subscore is 6.      Cranial Nerves: Cranial nerves 2-12 are intact.      Sensory: Sensation is intact.      Motor: Motor function is intact.      Coordination: Coordination is intact.   Psychiatric:         Attention and Perception: Attention and perception normal.         Mood and Affect: Mood and affect normal.         Behavior: Behavior normal. Behavior is cooperative.         4. LABS:   Arterial Blood Gases   No lab results found in last 7 days.  Complete Blood Count   Recent Labs   Lab 02/11/24  1110 02/11/24  0351   WBC 23.9* 30.7*   HGB 13.6 14.1    227     Basic Metabolic Panel  Recent Labs   Lab 02/11/24  1305 02/11/24  1211 02/11/24  1115 02/11/24  1110 02/11/24  0921 02/11/24  0920 02/11/24  0646 02/11/24  0610 02/11/24  0439 02/11/24  0351   NA  --   --   --  136  --  137  --  131*  --  132*   POTASSIUM  --   --   --  3.9  --  4.0  --  4.7  --  5.1   CHLORIDE  --   --   --  104   --  102  --  94*  --  91*   CO2  --   --   --  22  --  21*  --  13*  --  13*   BUN  --   --   --  27.5*  --  29.6*  --  34.6*  --  38.0*   CR  --   --   --  0.97  --  1.06  --  1.27*  --  1.31*   * 125* 163* 175*   < > 218*   < > 377*   < > 494*    < > = values in this interval not displayed.     Liver Function Tests  Recent Labs   Lab 02/11/24  0351   AST 16   ALT 23   ALKPHOS 107   BILITOTAL 0.5   ALBUMIN 4.2     Coagulation Profile  No lab results found in last 7 days.    5. RADIOLOGY:   Recent Results (from the past 24 hour(s))   CT CHEST ABD WO IV CONTRAST    Narrative    For Patients:  As a result of the 21st Century Cures Act, medical imaging exams  and procedure reports are released immediately into your electronic medical  record.  You may view this report before your referring provider.  If you have  questions, please contact your health care provider.      INDICATION:  Leukocytosis, high blood sugar     TECHNIQUE:  CT chest, abdomen and pelvis acquired without contrast.    COMPARISON:  None.    FINDINGS:  CHEST  Lungs: No focal consolidation. No pneumothorax. No effusion.   Mediastinum: Unremarkable.   Lymph nodes: No gross lymphadenopathy.   Soft tissues: Unremarkable.   Bones: Unremarkable.     ABDOMEN AND PELVIS  Hepatobiliary: No acute abnormality appreciated.   Spleen: Unremarkable.   Pancreas: Unremarkable.   Adrenal glands: Unremarkable.   Kidneys: Simple cyst left kidney. No significant parenchymal abnormality. No  calculi. No hydronephrosis.   Bowel: No obstruction. No focal perienteric or pericolonic stranding is  appreciated. The appendix is visualized and appears unremarkable.   Vascular: Not well evaluated on this noncontrast examination. Scattered areas of  arterial calcification.   Lymph nodes: No gross lymphadenopathy.   Peritoneum: No free air. No free fluid.   : Moderate-to-large bladder volume, otherwise unremarkable.   Soft tissues: Unremarkable.   Bones: Unremarkable.      IMPRESSION:  No abnormal findings appreciated to explain patient`s reported symptoms.      Please note that all CT scans at this facility use dose modulation, iterative  reconstruction, and/or weight-based dosing when appropriate to reduce radiation  dose to as low as reasonably achievable.    Dictated by Adan Velazquez MD @ 2/11/2024 2:04:10 AM    Electronically Signed

## 2024-02-12 PROBLEM — E10.10 DKA, TYPE 1 (H): Status: ACTIVE | Noted: 2024-02-12

## 2024-02-12 LAB
ANION GAP SERPL CALCULATED.3IONS-SCNC: 10 MMOL/L (ref 7–15)
ANION GAP SERPL CALCULATED.3IONS-SCNC: 10 MMOL/L (ref 7–15)
ATRIAL RATE - MUSE: 110 BPM
B-OH-BUTYR SERPL-SCNC: 0.7 MMOL/L
B-OH-BUTYR SERPL-SCNC: 1.1 MMOL/L
BUN SERPL-MCNC: 21.7 MG/DL (ref 6–20)
BUN SERPL-MCNC: 23.7 MG/DL (ref 6–20)
CALCIUM SERPL-MCNC: 8.2 MG/DL (ref 8.6–10)
CALCIUM SERPL-MCNC: 8.4 MG/DL (ref 8.6–10)
CHLORIDE SERPL-SCNC: 104 MMOL/L (ref 98–107)
CHLORIDE SERPL-SCNC: 107 MMOL/L (ref 98–107)
CREAT SERPL-MCNC: 0.85 MG/DL (ref 0.67–1.17)
CREAT SERPL-MCNC: 0.85 MG/DL (ref 0.67–1.17)
DEPRECATED HCO3 PLAS-SCNC: 23 MMOL/L (ref 22–29)
DEPRECATED HCO3 PLAS-SCNC: 23 MMOL/L (ref 22–29)
DIASTOLIC BLOOD PRESSURE - MUSE: NORMAL MMHG
EGFRCR SERPLBLD CKD-EPI 2021: >90 ML/MIN/1.73M2
EGFRCR SERPLBLD CKD-EPI 2021: >90 ML/MIN/1.73M2
ERYTHROCYTE [DISTWIDTH] IN BLOOD BY AUTOMATED COUNT: 12.8 % (ref 10–15)
GLUCOSE BLDC GLUCOMTR-MCNC: 104 MG/DL (ref 70–99)
GLUCOSE BLDC GLUCOMTR-MCNC: 109 MG/DL (ref 70–99)
GLUCOSE BLDC GLUCOMTR-MCNC: 115 MG/DL (ref 70–99)
GLUCOSE BLDC GLUCOMTR-MCNC: 127 MG/DL (ref 70–99)
GLUCOSE BLDC GLUCOMTR-MCNC: 129 MG/DL (ref 70–99)
GLUCOSE BLDC GLUCOMTR-MCNC: 137 MG/DL (ref 70–99)
GLUCOSE BLDC GLUCOMTR-MCNC: 140 MG/DL (ref 70–99)
GLUCOSE BLDC GLUCOMTR-MCNC: 143 MG/DL (ref 70–99)
GLUCOSE BLDC GLUCOMTR-MCNC: 159 MG/DL (ref 70–99)
GLUCOSE BLDC GLUCOMTR-MCNC: 159 MG/DL (ref 70–99)
GLUCOSE BLDC GLUCOMTR-MCNC: 165 MG/DL (ref 70–99)
GLUCOSE BLDC GLUCOMTR-MCNC: 182 MG/DL (ref 70–99)
GLUCOSE BLDC GLUCOMTR-MCNC: 301 MG/DL (ref 70–99)
GLUCOSE BLDC GLUCOMTR-MCNC: 89 MG/DL (ref 70–99)
GLUCOSE BLDC GLUCOMTR-MCNC: 99 MG/DL (ref 70–99)
GLUCOSE SERPL-MCNC: 110 MG/DL (ref 70–99)
GLUCOSE SERPL-MCNC: 133 MG/DL (ref 70–99)
HCT VFR BLD AUTO: 38.5 % (ref 40–53)
HGB BLD-MCNC: 13.3 G/DL (ref 13.3–17.7)
INTERPRETATION ECG - MUSE: NORMAL
MAGNESIUM SERPL-MCNC: 2 MG/DL (ref 1.7–2.3)
MCH RBC QN AUTO: 29.7 PG (ref 26.5–33)
MCHC RBC AUTO-ENTMCNC: 34.5 G/DL (ref 31.5–36.5)
MCV RBC AUTO: 86 FL (ref 78–100)
P AXIS - MUSE: 35 DEGREES
PHOSPHATE SERPL-MCNC: 1.7 MG/DL (ref 2.5–4.5)
PHOSPHATE SERPL-MCNC: 3.4 MG/DL (ref 2.5–4.5)
PLATELET # BLD AUTO: 160 10E3/UL (ref 150–450)
POTASSIUM SERPL-SCNC: 3.8 MMOL/L (ref 3.4–5.3)
POTASSIUM SERPL-SCNC: 4 MMOL/L (ref 3.4–5.3)
PR INTERVAL - MUSE: 122 MS
QRS DURATION - MUSE: 82 MS
QT - MUSE: 332 MS
QTC - MUSE: 449 MS
R AXIS - MUSE: 65 DEGREES
RBC # BLD AUTO: 4.48 10E6/UL (ref 4.4–5.9)
SODIUM SERPL-SCNC: 137 MMOL/L (ref 135–145)
SODIUM SERPL-SCNC: 140 MMOL/L (ref 135–145)
SYSTOLIC BLOOD PRESSURE - MUSE: NORMAL MMHG
T AXIS - MUSE: 38 DEGREES
VENTRICULAR RATE- MUSE: 110 BPM
WBC # BLD AUTO: 16 10E3/UL (ref 4–11)

## 2024-02-12 PROCEDURE — 84100 ASSAY OF PHOSPHORUS: CPT

## 2024-02-12 PROCEDURE — 120N000002 HC R&B MED SURG/OB UMMC

## 2024-02-12 PROCEDURE — 250N000013 HC RX MED GY IP 250 OP 250 PS 637: Performed by: INTERNAL MEDICINE

## 2024-02-12 PROCEDURE — 36415 COLL VENOUS BLD VENIPUNCTURE: CPT

## 2024-02-12 PROCEDURE — 99233 SBSQ HOSP IP/OBS HIGH 50: CPT | Performed by: STUDENT IN AN ORGANIZED HEALTH CARE EDUCATION/TRAINING PROGRAM

## 2024-02-12 PROCEDURE — 250N000011 HC RX IP 250 OP 636: Mod: JZ

## 2024-02-12 PROCEDURE — 80048 BASIC METABOLIC PNL TOTAL CA: CPT

## 2024-02-12 PROCEDURE — 82010 KETONE BODYS QUAN: CPT

## 2024-02-12 PROCEDURE — 83735 ASSAY OF MAGNESIUM: CPT

## 2024-02-12 PROCEDURE — 250N000012 HC RX MED GY IP 250 OP 636 PS 637: Performed by: STUDENT IN AN ORGANIZED HEALTH CARE EDUCATION/TRAINING PROGRAM

## 2024-02-12 PROCEDURE — 82962 GLUCOSE BLOOD TEST: CPT

## 2024-02-12 PROCEDURE — 250N000013 HC RX MED GY IP 250 OP 250 PS 637

## 2024-02-12 PROCEDURE — 99232 SBSQ HOSP IP/OBS MODERATE 35: CPT

## 2024-02-12 PROCEDURE — 250N000011 HC RX IP 250 OP 636

## 2024-02-12 PROCEDURE — G0378 HOSPITAL OBSERVATION PER HR: HCPCS

## 2024-02-12 PROCEDURE — 99232 SBSQ HOSP IP/OBS MODERATE 35: CPT | Performed by: STUDENT IN AN ORGANIZED HEALTH CARE EDUCATION/TRAINING PROGRAM

## 2024-02-12 PROCEDURE — 85027 COMPLETE CBC AUTOMATED: CPT

## 2024-02-12 RX ORDER — CEFEPIME HYDROCHLORIDE 2 G/1
2 INJECTION, POWDER, FOR SOLUTION INTRAVENOUS EVERY 12 HOURS
Status: DISCONTINUED | OUTPATIENT
Start: 2024-02-12 | End: 2024-02-12

## 2024-02-12 RX ORDER — NICOTINE POLACRILEX 4 MG
15-30 LOZENGE BUCCAL
Status: DISCONTINUED | OUTPATIENT
Start: 2024-02-12 | End: 2024-02-14 | Stop reason: HOSPADM

## 2024-02-12 RX ORDER — MAGNESIUM OXIDE 400 MG/1
400 TABLET ORAL EVERY 4 HOURS
Status: COMPLETED | OUTPATIENT
Start: 2024-02-12 | End: 2024-02-12

## 2024-02-12 RX ORDER — DEXTROSE MONOHYDRATE 100 MG/ML
INJECTION, SOLUTION INTRAVENOUS CONTINUOUS PRN
Status: DISCONTINUED | OUTPATIENT
Start: 2024-02-12 | End: 2024-02-12

## 2024-02-12 RX ORDER — DEXTROSE MONOHYDRATE 25 G/50ML
25-50 INJECTION, SOLUTION INTRAVENOUS
Status: DISCONTINUED | OUTPATIENT
Start: 2024-02-12 | End: 2024-02-12

## 2024-02-12 RX ORDER — DEXTROSE MONOHYDRATE 25 G/50ML
25-50 INJECTION, SOLUTION INTRAVENOUS
Status: DISCONTINUED | OUTPATIENT
Start: 2024-02-12 | End: 2024-02-14 | Stop reason: HOSPADM

## 2024-02-12 RX ORDER — POTASSIUM CHLORIDE 1500 MG/1
20 TABLET, EXTENDED RELEASE ORAL ONCE
Status: COMPLETED | OUTPATIENT
Start: 2024-02-12 | End: 2024-02-12

## 2024-02-12 RX ORDER — NICOTINE POLACRILEX 4 MG
15-30 LOZENGE BUCCAL
Status: DISCONTINUED | OUTPATIENT
Start: 2024-02-12 | End: 2024-02-12

## 2024-02-12 RX ADMIN — CEFEPIME 1 G: 1 INJECTION, POWDER, FOR SOLUTION INTRAMUSCULAR; INTRAVENOUS at 00:53

## 2024-02-12 RX ADMIN — INSULIN LISPRO 1 VIAL: 100 INJECTION, SOLUTION INTRAVENOUS; SUBCUTANEOUS at 13:30

## 2024-02-12 RX ADMIN — DEXTROSE AND SODIUM CHLORIDE: 5; 450 INJECTION, SOLUTION INTRAVENOUS at 07:40

## 2024-02-12 RX ADMIN — POTASSIUM & SODIUM PHOSPHATES POWDER PACK 280-160-250 MG 2 PACKET: 280-160-250 PACK at 10:58

## 2024-02-12 RX ADMIN — POTASSIUM & SODIUM PHOSPHATES POWDER PACK 280-160-250 MG 2 PACKET: 280-160-250 PACK at 16:18

## 2024-02-12 RX ADMIN — MAGNESIUM OXIDE TAB 400 MG (241.3 MG ELEMENTAL MG) 400 MG: 400 (241.3 MG) TAB at 06:43

## 2024-02-12 RX ADMIN — INSULIN ASPART 3 UNITS: 100 INJECTION, SOLUTION INTRAVENOUS; SUBCUTANEOUS at 10:45

## 2024-02-12 RX ADMIN — INSULIN LISPRO: 100 INJECTION, SOLUTION INTRAVENOUS; SUBCUTANEOUS at 16:17

## 2024-02-12 RX ADMIN — INSULIN ASPART 4 UNITS: 100 INJECTION, SOLUTION INTRAVENOUS; SUBCUTANEOUS at 14:11

## 2024-02-12 RX ADMIN — POTASSIUM & SODIUM PHOSPHATES POWDER PACK 280-160-250 MG 2 PACKET: 280-160-250 PACK at 06:43

## 2024-02-12 RX ADMIN — MAGNESIUM OXIDE TAB 400 MG (241.3 MG ELEMENTAL MG) 400 MG: 400 (241.3 MG) TAB at 10:58

## 2024-02-12 RX ADMIN — HEPARIN SODIUM 5000 UNITS: 5000 INJECTION, SOLUTION INTRAVENOUS; SUBCUTANEOUS at 10:02

## 2024-02-12 RX ADMIN — POTASSIUM CHLORIDE 20 MEQ: 1500 TABLET, EXTENDED RELEASE ORAL at 08:05

## 2024-02-12 ASSESSMENT — ACTIVITIES OF DAILY LIVING (ADL)
ADLS_ACUITY_SCORE: 18
ADLS_ACUITY_SCORE: 19
ADLS_ACUITY_SCORE: 18
ADLS_ACUITY_SCORE: 19
ADLS_ACUITY_SCORE: 18

## 2024-02-12 NOTE — PROGRESS NOTES
[facility] [place]  02/12/2024      Date of Service (when I saw the patient): 02/12/2024    ASSESSMENT: Amaury Healy is a 24 year old male with a medical history significant for DM type 1 who was admitted on 2/11/2024 for DKA after presenting to the ED reporting a one day history of nausea and vomiting.     CHANGES and MAJOR THINGS TODAY:   - Transfer to general medicine  - De-escalate DKA orderset per endocrinology      PLAN:    Neuro:  # No acute concerns  - Monitor neuro status       Pulmonary:  # No acute concerns  - Maintain oxygen saturation greater than 92% and titrate FiO2 to maintain saturation goal       Cardiovascular:  # Tachycardia - resolved  Likely s/t DKA. Patient otherwise hemodynamically intact.  - Continuous cardiac monitoring while in ICU  - MAP goal greater than 65 mmHg       GI/Nutrition:  # Nausea & vomiting - resolved  # Abdominal pain - resolved  Patient stated that he felt nauseous with vomiting and malaise around noon on 02/10, but reports no other GI symptoms, denied any recent illness.   - Zofran 4mg q6 hrs PRN  - Compazine 10 mg q6 hrs PRN    Diet: 60 gram CHO diet       Renal/Fluids/Electrolytes:  # AGMA - resolved  # PUMA - resolved  # Hypocalcemia  Cr was 1.31 on arrival to ICU, K+ was initially 6.5 with a corrected sodium of 141, pH of 7.2 and bicarb of 13 on arrival to ICU, received 2 amps of bicarb in the ED for a pH of 6.88 and bicarb < 5.  - Daily weights while in ICU  - Monitor I&O  - Electrolyte replacement protocols ordered  - BMP Q12H       Endocrine:  # DM, type 1  # DKA - resolved  Most recent A1C was 9.4 n 10/2020, A1C now 10.9 on arrival to ICU. Patient has implanted pump and glucometer (currently off). Initial AG of 28, BG of 494, Ketones of 7.9, and received 3 L of fluid in the ED  - Endocrinology consulted and appreciate recommendations  - DKA orderset de-escalated today   - Will continue standard insulin infusion until transition to PTA implanted pump  "insulin regimen this evening; endocrinology ordered  - Carb coverage insulin dosing ordered     ID:  # No acute concerns for infection  WBCs of 30.7 on arrival to ICU with no fevers or signs of infection.  - Infectious workup thus far unremarkable  - Cefepime 2/11-2/12.  - No indications for antibiotics at this time  - Daily CBC & CTM fever curve     Hematology:    # Leukocytosis - improved  - Trend fever and WBC curve  - Transfuse if hemoglobin less than 7.0  - CBC qday  - Stopped SQH DVT ppx 2/12/24       Musculoskeletal:  # No acute concerns  - Ambulate with assist  - TID ambulation       Skin:  # No acute concerns  - Appreciate diligent nursing cares      General Cares/Prophylaxis:    DVT Prophylaxis: PCDs and ambulate TID  GI Prophylaxis: Not indicated  Restraints: Not indicated  Family Communication: Updated  Code Status: Full Code    Lines/tubes/drains:  - PIVs    Disposition:  - Transferring to general medicine   - Sign out given to general medicine triage    Patient seen and findings/plan discussed with medical ICU staff, Dr. Vann.    Time spent with patient 35 min    GAUDENCIO Lino CNP      Clinically Significant Risk Factors          # Hypocalcemia: Lowest Ca = 7.9 mg/dL in last 2 days, will monitor and replace as appropriate    # Anion Gap Metabolic Acidosis: Highest Anion Gap = 28 mmol/L in last 2 days, will monitor and treat as appropriate            # DMII: A1C = 10.4 % (Ref range: <5.7 %) within past 6 months, PRESENT ON ADMISSION  # Overweight: Estimated body mass index is 25.46 kg/m  as calculated from the following:    Height as of this encounter: 1.803 m (5' 11\").    Weight as of this encounter: 82.8 kg (182 lb 8.7 oz)., PRESENT ON ADMISSION                 ====================================  INTERVAL HISTORY:   NAEON    OBJECTIVE:   1. VITAL SIGNS:   Temp:  [97.5  F (36.4  C)-98.8  F (37.1  C)] 98.8  F (37.1  C)  Pulse:  [] 84  Resp:  [14-22] 14  BP: ()/(55-79) 112/79  SpO2: "  [95 %-100 %] 97 %  Resp: 14    2. INTAKE/ OUTPUT:   I/O last 3 completed shifts:  In: 2683.95 [P.O.:480; I.V.:2203.95]  Out: 2200 [Urine:2200]    3. PHYSICAL EXAMINATION:    GEN: not in distress  EYES: PERRL, Anicteric sclera.   HEENT:  Normocephalic, atraumatic, trachea midline, Pupils PERRLA  CV: RRR, no gallops, rubs, or murmurs  PULM/CHEST: Clear breath sounds bilaterally without rhonchi, crackles or wheeze, symmetric chest rise  GI: normal bowel sounds, soft, non-tender, no rebound tenderness or guarding, no masses  : Voids spontaneously  EXTREMITIES: No peripheral edema, moving all extremities, peripheral pulses intact  NEURO: Cranial nerves II-XII grossly intact, no motor-sensory deficits noted  SKIN: No rashes, sores or ulcerations  PSYCH:  Affect: appropriate        4. LABS:   Arterial Blood Gases   No lab results found in last 7 days.  Complete Blood Count   Recent Labs   Lab 02/12/24  0604 02/11/24  1110 02/11/24  0351   WBC 16.0* 23.9* 30.7*   HGB 13.3 13.6 14.1    210 227     Basic Metabolic Panel  Recent Labs   Lab 02/12/24  0907 02/12/24  0801 02/12/24  0659 02/12/24  0604 02/12/24  0406 02/12/24  0157 02/11/24  2357 02/11/24  2209 02/11/24  1849 02/11/24  1800   NA  --   --   --  140  --  137  --  138  --  136   POTASSIUM  --   --   --  3.8  --  4.0  --  3.6  --  3.6   CHLORIDE  --   --   --  107  --  104  --  107  --  105   CO2  --   --   --  23  --  23  --  22  --  21*   BUN  --   --   --  21.7*  --  23.7*  --  24.3*  --  27.4*   CR  --   --   --  0.85  --  0.85  --  0.91  --  1.08   * 129* 89 110*   < > 127*  133*   < > 128*   < > 121*  122*    < > = values in this interval not displayed.     Liver Function Tests  Recent Labs   Lab 02/11/24  0351   AST 16   ALT 23   ALKPHOS 107   BILITOTAL 0.5   ALBUMIN 4.2     Coagulation Profile  No lab results found in last 7 days.    5. RADIOLOGY:   No results found for this or any previous visit (from the past 24 hour(s)).

## 2024-02-12 NOTE — PROGRESS NOTES
[facility]    ICU Accept Note - Hospitalist Service, GOLD TEAM 21       Date of Admission:  2/11/2024    Assessment & Plan   Amaury Healy is a 24 year old male admitted on 2/11/2024 who is being transferred out of the ICU on 2/12/2024. He has a history of type 1 diabetes. De-escalating insulin gtt with plan to resume PTA insulin pump this evening. Endocrinology following.     Active issues:   # DM, type 1  # DKA - resolved  Most recent A1C was 9.4 n 10/2020, A1C now 10.9 on arrival to ICU. Patient has implanted pump and glucometer (currently off). Initial AG of 28, BG of 494, Ketones of 7.9, and received 3 L of fluid in the ED. Etiology of DKA unclear.   - Endocrinology following, appreciate recommendations  - Stopped Lantus 2/12  - Start home insulin pump at 1600; Discontinue DKA IV insulin protocol and start regular adult IV insulin protocol, THEN Discontinue adult IV insulin 30 minutes after home insulin pump resumed (~1630)    - Novolog Meal Coverage: 1 unit per 8 grams CHO with meals/snacks --> discontinue after patient starts on home insulin pump.   - BG Monitoring: TID AC / HS / 0200.  - Carb coverage insulin dosing ordered    #Nausea, vomiting, abdominal pain, secondary to above, resolved  - management per above     #PUMA, resolved   Prerenal in the setting of DKA.   - trend Cr    #Leukocytosis   No localizing infectious etiology per history, exam or workup. Likely stress demargination in the setting of above. No antibiotics at this time.    Plan for next 24 hours:  - ensure glucose remains stable with resumption of insulin pump         ICU Transfer Checklist    YES NO Links/Additional comments   Current meds & orders reviewed & updated? [x] [] Transfer Navigator   O2 requirements appropriate for accepting floor? [x] [] O2 Device: None (Room air)       Appropriate antibiotics ordered? [] [x] Fever Report  30 Day Micro   Appropriate fluids ordered? [] [x]   "  Appropriate diet ordered? [x] [] Moderate Consistent Carb (60 g CHO per Meal) Diet   Telemetry indicated/ordered?    [] [x] Cardiac Monitoring: None     Appropriate DVT prophy ordered? [] [x] Anticoag/VTE   Castillo indicated/ordered?    [] [x] Not present   Central lines indicated? [] [x] LDA Avatar      PT/OT indicated/ordered? [] [] D/C Planner  [facility]   Code status reviewed? [x] [] Full Code   Preferred contact on file? [] [x]      Clinically Significant Risk Factors          # Hypocalcemia: Lowest Ca = 7.9 mg/dL in last 2 days, will monitor and replace as appropriate    # Anion Gap Metabolic Acidosis: Highest Anion Gap = 28 mmol/L in last 2 days, will monitor and treat as appropriate            # DMII: A1C = 10.4 % (Ref range: <5.7 %) within past 6 months, PRESENT ON ADMISSION  # Overweight: Estimated body mass index is 25.46 kg/m  as calculated from the following:    Height as of this encounter: 1.803 m (5' 11\").    Weight as of this encounter: 82.8 kg (182 lb 8.7 oz)., PRESENT ON ADMISSION            Disposition Plan     Expected Discharge Date: 02/12/2024                  Cecelia Parmar, DO  Hospitalist Service, GOLD TEAM 21  [facility]  Securely message with [employer] (more info)  Text page via [facility] Paging/Directory   See signed in provider for up to date coverage information  ______________________________________________________________________    Interval History   The patient feels well. Has been in his normal state of health prior to this episode. GI symptoms have resolved with improvement of his blood glucose. He follows with endocrinology through [facility].     Physical Exam   Vital Signs: Temp: 98.8  F (37.1  C) Temp src: Oral BP: 124/84 Pulse: 94   Resp: 15 SpO2: 97 % O2 Device: None (Room air)    Weight: 182 lbs 8.65 oz    General: well developed, awake, alert, no acute distress  HEENT: sclera clear, MMM  CV: RRR, no m/r/g. Extremities " are warm and well perfused.   LUNGS: CTAB, no w/r/c.  ABD: Soft, NT/ND, NBS, no masses or organomegaly.  SKIN: Warm, well perfused. No skin rashes or abnormal lesions.  MSK: No deformities. No clubbing, cyanosis, or edema.  NEURO: Alert and oriented. No focal deficits.      Medical Decision Making           Data   Unresulted Labs Ordered in the Past 30 Days of this Admission       Date and Time Order Name Status Description    2/11/2024  3:02 PM Blood Culture Hand, Right Preliminary     2/11/2024  3:02 PM Blood Culture Hand, Left Preliminary             I have personally reviewed the following data over the past 24 hrs:    16.0 (H)  \   13.3   / 160     140 107 21.7 (H) /  140 (H)   3.8 23 0.85 \

## 2024-02-12 NOTE — UTILIZATION REVIEW
"  Admission Status; Secondary Review Determination         Under the authority of the Utilization Management Committee, the utilization review process indicated a secondary review on the above patient.  The review outcome is based on review of the medical records, discussions with staff, and applying clinical experience noted on the date of the review.          (x) Observation Status Appropriate - This patient does not meet hospital inpatient criteria and is placed in observation status. If this patient's primary payer is Medicare and was admitted as an inpatient, Condition Code 44 should be used and patient status changed to \"observation\".     RATIONALE FOR DETERMINATION     Amaury Healy is a 24 year old male with history of DM type 1 who was admitted on 2/11/2024 for DKA after presenting to the ED reporting a one day history of nausea and vomiting.  He was treated with insulin drip. DKA has resolved. He is transitioning off inulin infusion.     The severity of illness, intensity of service provided, expected LOS and risk for adverse outcome make the care appropriate for further observation; however, doesn't meet criteria for hospital inpatient admission. Dr Attila Kent was notified of this determination.    This document was produced using voice recognition software.      The information on this document is developed by the utilization review team in order for the business office to ensure compliance.  This only denotes the appropriateness of proper admission status and does not reflect the quality of care rendered.         The definitions of Inpatient Status and Observation Status used in making the determination above are those provided in the CMS Coverage Manual, Chapter 1 and Chapter 6, section 70.4.      Sincerely,    Enrike Wilkes MD    Utilization Review  Physician Advisor  [facility].    "

## 2024-02-12 NOTE — PROGRESS NOTES
Inpatient Diabetes Management Service: Daily Progress Note     HPI: Amaury Healy is a 24 year old male with a medical history significant for DM type 1 who was admitted on 2/11/2024 for DKA after presenting to the ED reporting a one day history of nausea and vomiting.             Assessment/Plan:     Assessment:   Type 1 Diabetes Mellitus, uses ambulatory insulin pump, no known complications, poor control  (A1c 10.4 %) on 2/11/2024.  DKA, unclear etiology albeit patient does not frequently check BG, question if insulin in pump went bad.       Plan/Recommendations:   - Discontinue Lantus scheduled today at 1600.  - Start home insulin pump at 1600 (with home settings noted below) - using Humalog.  - Discontinue DKA IV insulin protocol and start regular adult IV insulin protocol, THEN Discontinue adult IV insulin 30 minutes after home insulin pump resumed (~1630)   - Novolog Meal Coverage: 1 unit per 8 grams CHO with meals/snacks --> discontinue after patient starts on home insulin pump.   - BG Monitoring: TID AC / HS / 0200. Patient not using CGM.  - Hypoglycemia protocol  - Carb counting protocol     Discussion: Good glucose control on IV insulin. Anion gap normalized 2/11. Patient eating. Will transition off IV insulin back to home insulin pump this afternoon.    Discharge Planning:(tentative)  Medications: humalog via insulin pump. Glucometer. --> encouraged to start on CGM outpatient.   Test Claims: none  Education: Needs to be assessed closer to discharge.   Outpatient Follow-up: [facility] Pediatric Endocrinologist -  Dr. Emanuel Ann, last visit 6/1/2023. Plans to meet with Dr. Ann one more time then transition to adult Endocrinology.     Plan discussed with patient, bedside RN, and primary team via this note.    Please notify Inpatient Diabetes Service if changes are planned to steroids, nutrition, TPN/TF and anticipated procedures requiring prolonged NPO status.     Inpatient  Diabetes Service will continue to follow, please don't hesitate to contact the team with any questions or concerns.     Wanda Aden PA-C  Inpatient Diabetes Service  Pager: [phone]  Available on DWNLD      To contact Inpatient Diabetes Service:     7 AM - 5 PM: Page the IDS SAADIA following the patient that day (see filed or incomplete progress notes/consult notes under Endocrinology)    OR if uncertain of provider assignment: page job code [phone]    5 PM - 7 AM: First call after hours is to primary service.    For urgent after-hours questions, page job code for on call fellow: [phone]           Interval History/Review of Systems :   - The last 24 hours progress and nursing notes reviewed.  - Overall feels well today. Denies nausea, emesis, abdominal pain.  - Patient aware that he should check his BG more frequently at home (sometimes checks once/day). He is open to re-trying CGM, last tried a few years ago and the device fell off frequently.   -  Switched from ICU to medicine team today.   - Has all pump supplies to restart on insulin pump.      Planned Procedures/Surgeries: none    Inpatient Glucose Control:              Medications Impacting Glycemia:   Steroids:  none   D5W-containing solutions/medications: D5 and NaCl infusion at 75 mL/hr 2/11 - 2/12 (stopped 1200).         Nutrition:   Orders Placed This Encounter      Moderate Consistent Carb (60 g CHO per Meal) Diet  Supplements: none   TF/TPN: none         Diabetes History: see full consult note for complete diabetes history   Diabetes Type and Duration: T1DM, since 9 years old     PTA Medication Regimen:   Amb pump:   - settings verified today, 2/12/2024.   Pump type: Metronic 770 Minmed per chart, cannot see model on pump    Pump insulin: Humalog      Basal rates:   0000 - 0300: 1.50 units/hr  0300 - 1200: 1.60 units/hr  1200 - 0000:  1.65 units/hr     Total Basal=38.7 units/24 hours     Insulin to carb ratio  0000 - 2359: 1 unit per 8 g CHO    "  ISF/sensitivity  0000 - 0800: 35  0800 - 2359: 30     BG Target:   0000 - 0800: 100 - 150  0500 -2030: 90 - 130  2030 - 2359: 100 - 150    Active insulin time: 3 hours     Glucose monitoring device and frequency: fingers sticks--maybe once or twice daily   Outpatient Diabetes Provider: [facility] Pediatric Endocrinologist -  Dr. Emanuel Ann, last visit 6/1/2024.        Physical Exam:   /77   Pulse 89   Temp 98  F (36.7  C) (Oral)   Resp 19   Ht 1.803 m (5' 11\")   Wt 82.8 kg (182 lb 8.7 oz)   SpO2 100%   BMI 25.46 kg/m    General Appearance:  Alert and interactive, NAD, resting comfortably in bed. Well nourished.  Girlfriend at bedside   HEENT:  NC/AT. MMM, EOMI, Anicteric. Hearing intact to conversational volume.    Lungs: Unlabored breathing on room air.   Abdomen: Round, nondistended. Soft, nontender.   Extremities:  No lower extremity edema.   Skin: Warm and dry. No visible rashes, lesions or bruising.   Neuro:  Oriented x3, able to speak clearly.    Psych:   Cooperative, appropriate mood and affect, good eye contact          Data:     Lab Results   Component Value Date    A1C 10.4 02/11/2024     BMP  Recent Labs   Lab 02/12/24  1414 02/12/24  1258 02/12/24  1203 02/12/24  1101 02/12/24  0659 02/12/24  0604 02/12/24  0406 02/12/24  0157 02/11/24  2357 02/11/24  2209 02/11/24  1849 02/11/24  1800   NA  --   --   --   --   --  140  --  137  --  138  --  136   POTASSIUM  --   --   --   --   --  3.8  --  4.0  --  3.6  --  3.6   CHLORIDE  --   --   --   --   --  107  --  104  --  107  --  105   MARY  --   --   --   --   --  8.4*  --  8.2*  --  8.1*  --  8.3*   CO2  --   --   --   --   --  23  --  23  --  22  --  21*   BUN  --   --   --   --   --  21.7*  --  23.7*  --  24.3*  --  27.4*   CR  --   --   --   --   --  0.85  --  0.85  --  0.91  --  1.08   * 115* 137* 159*   < > 110*   < > 127*  133*   < > 128*   < > 121*  122*    < > = values in this interval not displayed.     CBC  Recent Labs "   Lab 02/12/24  0604 02/11/24  1110 02/11/24  0351   WBC 16.0* 23.9* 30.7*   RBC 4.48 4.52 4.73   HGB 13.3 13.6 14.1   HCT 38.5* 37.6* 41.1   MCV 86 83 87   MCH 29.7 30.1 29.8   MCHC 34.5 36.2 34.3   RDW 12.8 12.1 11.8    210 227       I spent a total of 55 minutes on the date of the encounter doing prep/post-work, chart review, history and exam, documentation and further activities per the note including lab review, multidisciplinary communication, counseling the patient and/or coordinating care regarding acute hyper/hypoglycemic management, as well as discharge management and planning/communication.  See note for details.

## 2024-02-12 NOTE — PLAN OF CARE
ICU End of Shift Summary. See flowsheets for vital signs and detailed assessment.    Changes this shift: Pt A&Ox4, VSS on RA. Fair appetite on consistent carb diet. SO brought pt's home insulin pump supplies, pt transitioned off of hospital insulin gtt to ambulatory pump per endocrinology instruction. Downgraded to med surg status.     Plan: Once pt's dinner arrives, check BGL and document ambulatory insulin pump bolus in MAR. Transfer out of ICU.     Problem: Diabetic Ketoacidosis  Goal: Optimal Coping  Outcome: Adequate for Care Transition

## 2024-02-12 NOTE — PLAN OF CARE
Goal Outcome Evaluation:       Neuro: A&Ox4, Afebrile. Denies pain  Cardiac: NSR, normotensive  Respiratory: >90% on RA. LS-Clear, no cough  GI: LBM 2/11.   Diet/ Endo: consistent carb diet, Q2 BG maintained  : Voiding adequately in toilet  Skin: Scabs on L elbow and bilateral shins, small abrasion on back  LDA: R and L PIVs  Drips: Insulin: currently Algorithm 4,               D5 0.45% NaCl @ 75mL/hr    For vital signs and complete assessments, please see documentation flowsheets.

## 2024-02-13 LAB
ANION GAP SERPL CALCULATED.3IONS-SCNC: 12 MMOL/L (ref 7–15)
ANION GAP SERPL CALCULATED.3IONS-SCNC: 14 MMOL/L (ref 7–15)
ANION GAP SERPL CALCULATED.3IONS-SCNC: 17 MMOL/L (ref 7–15)
ANION GAP SERPL CALCULATED.3IONS-SCNC: 9 MMOL/L (ref 7–15)
B-OH-BUTYR SERPL-SCNC: 3.4 MMOL/L
BUN SERPL-MCNC: 15.3 MG/DL (ref 6–20)
BUN SERPL-MCNC: 15.6 MG/DL (ref 6–20)
BUN SERPL-MCNC: 17.7 MG/DL (ref 6–20)
BUN SERPL-MCNC: 18 MG/DL (ref 6–20)
CALCIUM SERPL-MCNC: 8.4 MG/DL (ref 8.6–10)
CALCIUM SERPL-MCNC: 8.6 MG/DL (ref 8.6–10)
CALCIUM SERPL-MCNC: 8.6 MG/DL (ref 8.6–10)
CALCIUM SERPL-MCNC: 8.7 MG/DL (ref 8.6–10)
CALCIUM SERPL-MCNC: 8.8 MG/DL (ref 8.6–10)
CALCIUM SERPL-MCNC: 8.9 MG/DL (ref 8.6–10)
CHLORIDE SERPL-SCNC: 101 MMOL/L (ref 98–107)
CHLORIDE SERPL-SCNC: 103 MMOL/L (ref 98–107)
CHLORIDE SERPL-SCNC: 104 MMOL/L (ref 98–107)
CHLORIDE SERPL-SCNC: 95 MMOL/L (ref 98–107)
CHLORIDE SERPL-SCNC: 99 MMOL/L (ref 98–107)
CHLORIDE SERPL-SCNC: 99 MMOL/L (ref 98–107)
CREAT SERPL-MCNC: 0.61 MG/DL (ref 0.67–1.17)
CREAT SERPL-MCNC: 0.62 MG/DL (ref 0.67–1.17)
CREAT SERPL-MCNC: 0.62 MG/DL (ref 0.67–1.17)
CREAT SERPL-MCNC: 0.64 MG/DL (ref 0.67–1.17)
CREAT SERPL-MCNC: 0.68 MG/DL (ref 0.67–1.17)
CREAT SERPL-MCNC: 0.72 MG/DL (ref 0.67–1.17)
DEPRECATED HCO3 PLAS-SCNC: 21 MMOL/L (ref 22–29)
DEPRECATED HCO3 PLAS-SCNC: 22 MMOL/L (ref 22–29)
DEPRECATED HCO3 PLAS-SCNC: 22 MMOL/L (ref 22–29)
DEPRECATED HCO3 PLAS-SCNC: 25 MMOL/L (ref 22–29)
DEPRECATED HCO3 PLAS-SCNC: 25 MMOL/L (ref 22–29)
DEPRECATED HCO3 PLAS-SCNC: 27 MMOL/L (ref 22–29)
EGFRCR SERPLBLD CKD-EPI 2021: >90 ML/MIN/1.73M2
ERYTHROCYTE [DISTWIDTH] IN BLOOD BY AUTOMATED COUNT: 12.3 % (ref 10–15)
GLUCOSE BLDC GLUCOMTR-MCNC: 102 MG/DL (ref 70–99)
GLUCOSE BLDC GLUCOMTR-MCNC: 157 MG/DL (ref 70–99)
GLUCOSE BLDC GLUCOMTR-MCNC: 159 MG/DL (ref 70–99)
GLUCOSE BLDC GLUCOMTR-MCNC: 193 MG/DL (ref 70–99)
GLUCOSE BLDC GLUCOMTR-MCNC: 207 MG/DL (ref 70–99)
GLUCOSE BLDC GLUCOMTR-MCNC: 232 MG/DL (ref 70–99)
GLUCOSE BLDC GLUCOMTR-MCNC: 244 MG/DL (ref 70–99)
GLUCOSE BLDC GLUCOMTR-MCNC: 254 MG/DL (ref 70–99)
GLUCOSE BLDC GLUCOMTR-MCNC: 263 MG/DL (ref 70–99)
GLUCOSE BLDC GLUCOMTR-MCNC: 279 MG/DL (ref 70–99)
GLUCOSE BLDC GLUCOMTR-MCNC: 282 MG/DL (ref 70–99)
GLUCOSE BLDC GLUCOMTR-MCNC: 285 MG/DL (ref 70–99)
GLUCOSE BLDC GLUCOMTR-MCNC: 339 MG/DL (ref 70–99)
GLUCOSE BLDC GLUCOMTR-MCNC: 371 MG/DL (ref 70–99)
GLUCOSE BLDC GLUCOMTR-MCNC: 76 MG/DL (ref 70–99)
GLUCOSE BLDC GLUCOMTR-MCNC: 89 MG/DL (ref 70–99)
GLUCOSE SERPL-MCNC: 170 MG/DL (ref 70–99)
GLUCOSE SERPL-MCNC: 228 MG/DL (ref 70–99)
GLUCOSE SERPL-MCNC: 279 MG/DL (ref 70–99)
GLUCOSE SERPL-MCNC: 280 MG/DL (ref 70–99)
GLUCOSE SERPL-MCNC: 363 MG/DL (ref 70–99)
GLUCOSE SERPL-MCNC: 69 MG/DL (ref 70–99)
HCT VFR BLD AUTO: 41.5 % (ref 40–53)
HGB BLD-MCNC: 14.3 G/DL (ref 13.3–17.7)
HOLD SPECIMEN: NORMAL
MAGNESIUM SERPL-MCNC: 2 MG/DL (ref 1.7–2.3)
MCH RBC QN AUTO: 29.4 PG (ref 26.5–33)
MCHC RBC AUTO-ENTMCNC: 34.5 G/DL (ref 31.5–36.5)
MCV RBC AUTO: 85 FL (ref 78–100)
PHOSPHATE SERPL-MCNC: 2.8 MG/DL (ref 2.5–4.5)
PLATELET # BLD AUTO: 160 10E3/UL (ref 150–450)
POTASSIUM SERPL-SCNC: 4 MMOL/L (ref 3.4–5.3)
POTASSIUM SERPL-SCNC: 4.1 MMOL/L (ref 3.4–5.3)
POTASSIUM SERPL-SCNC: 4.1 MMOL/L (ref 3.4–5.3)
POTASSIUM SERPL-SCNC: 4.2 MMOL/L (ref 3.4–5.3)
POTASSIUM SERPL-SCNC: 4.5 MMOL/L (ref 3.4–5.3)
POTASSIUM SERPL-SCNC: 4.7 MMOL/L (ref 3.4–5.3)
PROCALCITONIN SERPL IA-MCNC: 3.74 NG/ML
RBC # BLD AUTO: 4.87 10E6/UL (ref 4.4–5.9)
SODIUM SERPL-SCNC: 131 MMOL/L (ref 135–145)
SODIUM SERPL-SCNC: 133 MMOL/L (ref 135–145)
SODIUM SERPL-SCNC: 135 MMOL/L (ref 135–145)
SODIUM SERPL-SCNC: 137 MMOL/L (ref 135–145)
SODIUM SERPL-SCNC: 137 MMOL/L (ref 135–145)
SODIUM SERPL-SCNC: 140 MMOL/L (ref 135–145)
WBC # BLD AUTO: 8.7 10E3/UL (ref 4–11)

## 2024-02-13 PROCEDURE — 83735 ASSAY OF MAGNESIUM: CPT

## 2024-02-13 PROCEDURE — 99233 SBSQ HOSP IP/OBS HIGH 50: CPT | Performed by: STUDENT IN AN ORGANIZED HEALTH CARE EDUCATION/TRAINING PROGRAM

## 2024-02-13 PROCEDURE — 84100 ASSAY OF PHOSPHORUS: CPT | Performed by: INTERNAL MEDICINE

## 2024-02-13 PROCEDURE — 82962 GLUCOSE BLOOD TEST: CPT

## 2024-02-13 PROCEDURE — 82374 ASSAY BLOOD CARBON DIOXIDE: CPT | Performed by: STUDENT IN AN ORGANIZED HEALTH CARE EDUCATION/TRAINING PROGRAM

## 2024-02-13 PROCEDURE — 258N000003 HC RX IP 258 OP 636: Performed by: STUDENT IN AN ORGANIZED HEALTH CARE EDUCATION/TRAINING PROGRAM

## 2024-02-13 PROCEDURE — 36415 COLL VENOUS BLD VENIPUNCTURE: CPT | Performed by: STUDENT IN AN ORGANIZED HEALTH CARE EDUCATION/TRAINING PROGRAM

## 2024-02-13 PROCEDURE — 82010 KETONE BODYS QUAN: CPT | Performed by: STUDENT IN AN ORGANIZED HEALTH CARE EDUCATION/TRAINING PROGRAM

## 2024-02-13 PROCEDURE — 36415 COLL VENOUS BLD VENIPUNCTURE: CPT

## 2024-02-13 PROCEDURE — 120N000002 HC R&B MED SURG/OB UMMC

## 2024-02-13 PROCEDURE — 250N000011 HC RX IP 250 OP 636: Performed by: STUDENT IN AN ORGANIZED HEALTH CARE EDUCATION/TRAINING PROGRAM

## 2024-02-13 PROCEDURE — 250N000009 HC RX 250: Performed by: STUDENT IN AN ORGANIZED HEALTH CARE EDUCATION/TRAINING PROGRAM

## 2024-02-13 PROCEDURE — G0378 HOSPITAL OBSERVATION PER HR: HCPCS

## 2024-02-13 PROCEDURE — 85027 COMPLETE CBC AUTOMATED: CPT

## 2024-02-13 PROCEDURE — 84145 PROCALCITONIN (PCT): CPT

## 2024-02-13 RX ORDER — NICOTINE POLACRILEX 4 MG
15-30 LOZENGE BUCCAL
Status: DISCONTINUED | OUTPATIENT
Start: 2024-02-13 | End: 2024-02-13

## 2024-02-13 RX ORDER — DEXTROSE MONOHYDRATE, SODIUM CHLORIDE, AND POTASSIUM CHLORIDE 50; 1.49; 4.5 G/1000ML; G/1000ML; G/1000ML
INJECTION, SOLUTION INTRAVENOUS CONTINUOUS
Status: DISCONTINUED | OUTPATIENT
Start: 2024-02-13 | End: 2024-02-14

## 2024-02-13 RX ORDER — DEXTROSE MONOHYDRATE 25 G/50ML
25-50 INJECTION, SOLUTION INTRAVENOUS
Status: DISCONTINUED | OUTPATIENT
Start: 2024-02-13 | End: 2024-02-13

## 2024-02-13 RX ORDER — INSULIN LISPRO 100 [IU]/ML
INJECTION, SOLUTION INTRAVENOUS; SUBCUTANEOUS
Status: ON HOLD | COMMUNITY
End: 2024-02-14

## 2024-02-13 RX ORDER — SODIUM CHLORIDE AND POTASSIUM CHLORIDE 150; 450 MG/100ML; MG/100ML
INJECTION, SOLUTION INTRAVENOUS CONTINUOUS
Status: DISCONTINUED | OUTPATIENT
Start: 2024-02-13 | End: 2024-02-14

## 2024-02-13 RX ADMIN — INSULIN HUMAN 12 UNITS/HR: 1 INJECTION, SOLUTION INTRAVENOUS at 20:04

## 2024-02-13 RX ADMIN — SODIUM CHLORIDE 1000 ML: 9 INJECTION, SOLUTION INTRAVENOUS at 11:52

## 2024-02-13 RX ADMIN — POTASSIUM CHLORIDE AND SODIUM CHLORIDE: 450; 150 INJECTION, SOLUTION INTRAVENOUS at 11:56

## 2024-02-13 RX ADMIN — INSULIN HUMAN 3.5 UNITS/HR: 1 INJECTION, SOLUTION INTRAVENOUS at 13:03

## 2024-02-13 RX ADMIN — SODIUM CHLORIDE, POTASSIUM CHLORIDE, SODIUM LACTATE AND CALCIUM CHLORIDE 1000 ML: 600; 310; 30; 20 INJECTION, SOLUTION INTRAVENOUS at 10:11

## 2024-02-13 RX ADMIN — POTASSIUM CHLORIDE, DEXTROSE MONOHYDRATE AND SODIUM CHLORIDE: 150; 5; 450 INJECTION, SOLUTION INTRAVENOUS at 18:07

## 2024-02-13 ASSESSMENT — ACTIVITIES OF DAILY LIVING (ADL)
ADLS_ACUITY_SCORE: 18

## 2024-02-13 NOTE — PLAN OF CARE
Transferred to: 8 Med Surg room 814 at 2100 2/12/2024  Belongings: clothing, shoes, phone, , insulin pump all transferred with patient    Pt is A&O x4. Insulin given via personal insulin pump, 4.7 units with dinner, blood sugar 165. Pt up independently in room. Denies nausea, pain, numbness and tingling. Labs obtained prior to transfer. Report given to 8A nurse Domitila, questions answered. Transferred with 10A ICU NST via wheelchair with belongings, meds, and chart.

## 2024-02-13 NOTE — PROGRESS NOTES
[facility]    Medicine Progress Note - Hospitalist Service, GOLD TEAM 21    Date of Admission:  2/11/2024    Assessment & Plan   Amaury Healy is a 24 year old male admitted on 2/11/2024 who is being transferred out of the ICU on 2/12/2024. He has a history of type 1 diabetes. Transitioned to PTA insulin pump on transfer out of the ICU but developed recurrent hyperglycemia with gap morning of 2/13. Endocrine following. Stopped PTA insulin pump and restarted on insulin gtt per DKA protocol for 24 hours.     Active issues:   # DM, type 1  # DKA - recurrent  Most recent A1C was 9.4 n 10/2020, A1C now 10.9 on arrival to ICU. Patient has implanted pump and glucometer (currently off). Initial AG of 28, BG of 494, Ketones of 7.9, and received 3 L of fluid in the ED. Etiology of DKA unclear. No localizing infection by history and he otherwise feels well with improvement of his sugars.     Unfortunately, recurrent hyperglycemia overnight and into the 300's this AM. Per endo, stop insulin pump, resume insulin gtt with plan to continue overnight.    - Endocrinology following, appreciate recommendations  - Stopped Lantus 2/12  - Stop home insulin pump   - Start insulin gtt per DKA protocol    - K of 4.5 this AM and fluid orders per protocol reflect this.   - Start Novolog carbohydrate coverage 1 unit per 6 g CHO TID AC and PRN snacks.   - No Novolog correction sliding scale while on IV insulin.   - BG Monitoring: Q1-2H    #Nausea, vomiting, abdominal pain, secondary to above, resolved  - management per above     #PUMA, resolved   Prerenal in the setting of DKA.   - trend Cr    #Leukocytosis   No localizing infectious etiology per history, exam or workup. Likely stress demargination in the setting of above. No antibiotics at this time.            Diet: Moderate Consistent Carb (60 g CHO per Meal) Diet    DVT Prophylaxis: Low Risk/Ambulatory with no VTE prophylaxis indicated  [place]illo  "Catheter: Not present  Lines: None     Cardiac Monitoring: None  Code Status: Full Code      Clinically Significant Risk Factors                        # DMII: A1C = 10.4 % (Ref range: <5.7 %) within past 6 months, PRESENT ON ADMISSION  # Overweight: Estimated body mass index is 25.46 kg/m  as calculated from the following:    Height as of this encounter: 1.803 m (5' 11\").    Weight as of this encounter: 82.8 kg (182 lb 8.7 oz)., PRESENT ON ADMISSION            Disposition Plan      Expected Discharge Date: 02/13/2024                    Cecelia Parmar DO  Hospitalist Service, GOLD TEAM 21  [facility]  Securely message with [email or web address] (more info)  Text page via [facility] Paging/Directory   See signed in provider for up to date coverage information  ______________________________________________________________________    Interval History   No acute events overnight. Uptrending blood glucose this AM. He feels well, tolerating PO without nausea, vomiting or abdominal pain.     Physical Exam   Vital Signs: Temp: 97.7  F (36.5  C) Temp src: Oral BP: 117/71 Pulse: 94   Resp: 16 SpO2: 98 % O2 Device: None (Room air)    Weight: 182 lbs 8.65 oz    General: well developed, awake, alert, no acute distress  HEENT: sclera clear, MMM  CV: RRR, no m/r/g. Extremities are warm and well perfused.   LUNGS: CTAB, no w/r/c.  ABD: Soft, NT/ND, NBS, no masses or organomegaly.  SKIN: Warm, well perfused. No skin rashes or abnormal lesions.  MSK: Normal gait. No deformities.  NEURO: Ambulating with no limitations. No focal deficits.    Medical Decision Making           Data     I have personally reviewed the following data over the past 24 hrs:    8.7  \   14.3   / 160     135 101 17.7 /  159 (H)   4.1 25 0.61 (L) \     Procal: 3.74 (H) CRP: N/A Lactic Acid: N/A         Imaging results reviewed over the past 24 hrs:   No results found for this or any previous visit (from the past 24 hour(s)).  "

## 2024-02-13 NOTE — UTILIZATION REVIEW
Inpatient appropriate    Admission Status; Secondary Review Determination      Under the authority of the Utilization Management Committee, the utilization review process indicated a secondary review on the above patient. The review outcome is based on review of the medical records, discussions with staff, and applying clinical experience noted on the date of the review.    (x) Inpatient Status Appropriate - This patient's medical care is consistent with medical management for inpatient care and reasonable inpatient medical practice.    RATIONALE FOR DETERMINATION  24-year-old male with history of diabetes mellitus type 1 was admitted to [facility] on 2/11/2024 with diabetic ketoacidosis.  At presentation his bicarb was 13 with an anion gap of 28 significantly elevated ketones and a pH of 7.20.  Patient was initially managed in the ICU on insulin drip.  He also had significant leukocytosis of 30,000 with an elevated procalcitonin of 14.88 which was unclear initially if it was due to infectious etiology or stress demargination.  Patient was subsequently transferred out of the ICU with improvement in anion gap and blood sugars however after starting his ambulatory insulin pump, patient slipped back again into diabetic ketoacidosis and has been placed back on insulin drip this morning.  Endocrinology is recommending keeping the patient on insulin drip for another 24 hours while closely monitoring his ketones and anion gap.  Given the need for multiple rounds of insulin drip and initial presentation with multiple abnormal findings, inpatient care is appropriate at this time.    At the time of admission with the information available to the attending physician more than 2 nights Hospital complex care was anticipated, based on patient risk of adverse outcome if treated as outpatient and complex care required. Inpatient admission is appropriate based on the Medicare guidelines.    This document was produced using voice  recognition software      The information on this document is developed by the utilization review team in order for the business office to ensure compliance. This only denotes the appropriateness of proper admission status and does not reflect the quality of care rendered.  The definitions of Inpatient Status and Observation Status used in making the determination above are those provided in the CMS Coverage Manual, Chapter 1 and Chapter 6, section 70.4.    Sincerely,    Utilization Review  Physician Advisor  [facility].

## 2024-02-13 NOTE — CONSULTS
"Diabetes CNS and Educator consults received for Amaury Healy, age 24, with a medical history significant for DM type 1.  He was admitted on 2/11/2024 for DKA after presenting to the ED reporting a one day history of nausea and vomiting.      The Inpatient Diabetes Service/Adult Endocrinology was consulted on 2/11/24 for glycemic management.  Per Angie La PA-C, note:  \"Patient states that around noon on Friday he was skate boarding after eating [employer] and developed nausea. He only took 1/2 of his normal bolus with his pump because he was skate boarding.  On Saturday he still did not feel well.  He ate s small amount of breakfast that he vomited up,  He checked his blood glucose and it was 552.  He gave himself a bolus with his pump and brought his BG down to 540 and then 400 and then 300.  He then had emesis x2 and felt warm. He denies polyuria but was thirsty. He also had nausea and general malaise. He thinks his pump was functioning because his BG went from 552 to 540 to 400 to 300. Not sure if he looked at the infusion site and does not remember when he last changed his infusion set.  He denies recent illness such as fevers, cough, cold, abdominal pain, infected tooth, sores on feet, no healing sores,dysuria. Patient has no history of DKA.\"  See the full note for all information.    Transferred out of the ICU to a med-surg unit yesterday evening, 2/12. IV insulin infusion was discontinued and his ambulatory pump was restarted at 1600. Back in DKA this am and ambulatory pump removed, IV insulin initiated, although did not get started until 1303.    Ambulatory Insulin Infusion Pump  Diagnosis Type 1 DM at age 9, 1/2010; pump therapy initiated 2014 at age 13  Pump-Medtronic 770 G; MD suggested to upgrade to 780 at last visit  Reservoir-3 ml  Infusion Set-Quik Set  CGM-none; does not like the Medtronic Guardian that could communicate with his pump  Insulin-Lispro (Humalog)  Pediatric Endocrinologist-Dr Castellanos " Seth; last seen on 6/1/2023; [facility] Endocrinology    Basal rates:   0602-2460 --> 1.5 units/hour  3532-1970 am--> 1.6 units/hour  2863-8189--> 1.65 units/hour     Total Basal=38.7 units/24 hours     Insulin to carb ratio  9952-9829-1/8 grams of CHO  8926-6453-1/8 grams of CHO  2645-0512-1/8 grams of CHO  8512-4234-1/8 grams of CHO     ISF/sensitivity  0948-5047-1/35 mg/dL  1270-0808-1/30 mg/dL        BG Target:   9294-7939-651-150 mg/dL    6754-1131-  mg/dL  9601-0044- 100-150 mg/dL    Active Insulin Time:  3 hours    Since pump is off now, unable to actually observe from start to finish pump prep and placement.  Can do this when transitioning back to pump.  Amaury has the ability to safely perfom skills; knows the workings and menus of the pump.    GAUDENCIO Samuels  Diabetes Clinical Nurse Specialist/[facility]  [phone]

## 2024-02-13 NOTE — PHARMACY-ADMISSION MEDICATION HISTORY
Pharmacist Admission Medication History    Admission medication history is complete. The information provided in this note is only as accurate as the sources available at the time of the update.    Information Source(s): Patient and CareEverywhere/SureScripts via in-person    Pertinent Information:   -Patient confirmed he only takes Humalog insulin via insulin pump at home.   -Pump settings updated via endocrinology note who verified pump settings    Changes made to PTA medication list:  Added: humalog  Deleted: None  Changed: None    Allergies reviewed with patient and updates made in EHR: yes    Medication History Completed By: Amanda Srivastava [facility] 2/13/2024 3:23 PM    PTA Med List   Medication Sig Last Dose    insulin lispro (HUMALOG VIAL) 100 UNIT/ML vial Inject subcutaneously via ambulatory insulin pump.  Continuous rate:  8840-2099: 1.5 units/hour  3184-5761: 1.6 units/hour  7709-2769: 1.65 units/hour    Carb Coverage: 1 unit per 8 grams carbohydrates    Insulin sensitivity factor:  5847-9274: 35  3295-9243: 30    Blood glucose goals:  2287-9343: 100-150  6480-7055:   0759-6478: 100-150

## 2024-02-13 NOTE — CONSULTS
Consulted by Wanda Aden to run a test claim for CGMs (delia, dexcom, & medtronic guardian).    Patient has pharmacy benefits through [employer] ([employer]). Per insurance, CGMs are not covered under pharmacy benefits.     For coverage, this will need to be processed under patient's medical benefits. Regular retail pharmacies unable to bill the medical benefit. Coverage investigation and fulfillment of supplies covered under this portion of the insurance is executed by the Diabetic Pumps Team at [facility].      This team will initiate coverage investigation only upon receipt of RX (e-prescribed to [facility]), often requiring a Statement of Medical Necessity. They would follow up with patient by phone regarding cost and/or delivery. Patient is under no obligation to fill RXs if they are cost prohibitive, or any other reason.       Please feel free to contact me with any other test claims, prior authorizations, or insurance questions regarding outpatient medications.     Thanks!      Cyndi Zeng [facility]  Discharge Pharmacy Liaison  [facility]  Pronouns: She/Her/Hers    Securely message with [employer], [employer] Secure Chat, or [employer]  Phone: [phone]  Fax: [phone]  [email or web address]

## 2024-02-13 NOTE — PROGRESS NOTES
Pt A & O X 4. Up independently in room to the BR. Denies pain/discomfort. Quiet/pleasant/able to make needs known. BG at 0248 was 263. Pt gave himself 3.2 units from his insulin pump. Pt reported that anytime he checks his BG and it's above goal of , then he has to correct it. Nursing is monitoring and assisting as needed      Domitila Estrada RN

## 2024-02-13 NOTE — PLAN OF CARE
"  VS: /79 (BP Location: Right arm, Patient Position: Supine)   Pulse 87   Temp 98.3  F (36.8  C) (Oral)   Resp 18   Ht 1.803 m (5' 11\")   Wt 82.8 kg (182 lb 8.7 oz)   SpO2 97%   BMI 25.46 kg/m       O2: Stable @ RA,Denied SOB ,Chest pain   Output: Continent of b/b   Activity: Independent in room   Skin: Visible skin intact   Pain: Denies pain   CMS: AXOX4   Diet: On regular diet   LDA: 2 R.PIVs Infusing Insulin Drip and 0.45% Nacl + KCL 20mEq   Equipment: Personal belongings   Plan: Continue with POC   Additional Info: Pt on Insulin  drip,Currently on Algorithm 3  Pt BG reduced to 157.and  0.45% Nacl +  kcl discontinued and Dextrose 5% +0.45% + 20mEq started @ 75mls/hr  Pt insulin carb coverage for 1700 held by provider  Providers updated  Pt last BG @ 1900- 193  Report given to the next nurse                           "

## 2024-02-13 NOTE — PROGRESS NOTES
Inpatient Diabetes Management Service: Daily Progress Note     HPI: Amaury Healy is a 24 year old male with a medical history significant for DM type 1 who was admitted on 2/11/2024 for DKA after presenting to the ED reporting a one day history of nausea and vomiting.             Assessment/Plan:     Assessment:   Type 1 Diabetes Mellitus, uses ambulatory insulin pump, no known complications, poor control  (A1c 10.4 %) on 2/11/2024.  DKA, initially resolved 2/11 --> DKA reoccurred 2/13 after restarting home insulin pump 2/12.        Plan/Recommendations:    - Patient to give 5 units correction bolus via home insulin pump at 10:30am while waiting to resume IV insulin in setting of recurrent DKA. Patient to take insulin pump off once starting IV insulin.   - Start IV insulin (DKA protocol) --> ok to switch to non-DKA IV insulin gtt once anion gap closes and electrolytes normalize.   - Start Novolog carbohydrate coverage 1 unit per 6 g CHO TID AC and PRN snacks.   - No Novolog correction scale while on IV insulin.   - BG monitoring: q1-2 hr per IV insulin protocol protocol. Patient not using CGM.  - Hypoglycemia protocol  - Carb counting protocol     Discussion: Hyperglycemia since resuming insulin pump yesterday evening. BMP this morning reveals DKA anion gap (17), bicarb (21), glucose (280), ketones pending (suspect elevated). BG continued to elevate following breakfast (371 mg/dL). Question if due to pump error or pump settings are not strong enough (suspect settings are not strong enough). Talked with patient and primary team, plan to restart IV insulin DKA protocol. Patient instructed to give 5 unit bolus via pump at 10:30am while waiting for IV insulin to start - instructed to take pump off when IV insulin starts. Patient to inspect infusion set for any issues. Patient would still like to resume insulin pump prior to discharge, will plan to monitor and intensify home insulin pump  settings prior to discharge. Diabetes education plans to meet with patient for pump assessment today.    Discharge Planning: (tentative)  Medications: humalog via insulin pump. Glucometer. Encouraged to start on CGM outpatient.   Test Claims: CGM coverage requested 2/13.   Education: Will meet with patient today, 2/13 for pump assessment.   Outpatient Follow-up: [facility] Pediatric Endocrinologist -  Dr. Emanuel Ann, last visit 6/1/2023. Plans to meet with Dr. Ann one more time then transition to adult Endocrinology.     Plan discussed with patient, bedside RN, and primary team via this note.    Please notify Inpatient Diabetes Service if changes are planned to steroids, nutrition, TPN/TF and anticipated procedures requiring prolonged NPO status.     Inpatient Diabetes Service will continue to follow, please don't hesitate to contact the team with any questions or concerns.     Wanda Aden PA-C  Inpatient Diabetes Service  Pager: [phone]  Available on [facility]      To contact Inpatient Diabetes Service:     7 AM - 5 PM: Page the [employer] [facility] following the patient that day (see filed or incomplete progress notes/consult notes under Endocrinology)    OR if uncertain of provider assignment: page job code [phone]    5 PM - 7 AM: First call after hours is to primary service.    For urgent after-hours questions, page job code for on call fellow: [phone]           Interval History/Review of Systems :   - The last 24 hours progress and nursing notes reviewed.  - Patient feels well today. Denies nausea, emesis or pain.   - Upset that his BG is so elevated today, thinks his pump is working (will verify once taking pump off).       Planned Procedures/Surgeries: none    Inpatient Glucose Control:              Medications Impacting Glycemia:   Steroids:  none   D5W-containing solutions/medications: D5 and NaCl infusion at 75 mL/hr 2/11 - 2/12 (stopped 1200).         Nutrition:   Orders Placed This Encounter      Moderate Consistent  "Carb (60 g CHO per Meal) Diet  Supplements: none   TF/TPN: none         Diabetes History: see full consult note for complete diabetes history   Diabetes Type and Duration: T1DM, since 9 years old     PTA Medication Regimen:   Amb pump:   - settings verified on pump 2/12/2024.   Pump type: Metronic 770 Minmed per chart, cannot see model on pump    Pump insulin: Humalog      Basal rates:   0000 - 0300: 1.50 units/hr  0300 - 1200: 1.60 units/hr  1200 - 0000:  1.65 units/hr     Total Basal=38.7 units/24 hours     Insulin to carb ratio  0000 - 2359: 1 unit per 8 g CHO     ISF/sensitivity  0000 - 0800: 35  0800 - 2359: 30     BG Target:   0000 - 0800: 100 - 150  0500 -2030: 90 - 130  2030 - 2359: 100 - 150    Active insulin time: 3 hours     Glucose monitoring device and frequency: fingers sticks--maybe once or twice daily   Outpatient Diabetes Provider: Replaced by [facility] Pediatric Endocrinologist -  Dr. Emanuel Ann, last visit 6/1/2024.        Physical Exam:   /71 (BP Location: Right arm)   Pulse 94   Temp 97.7  F (36.5  C) (Oral)   Resp 16   Ht 1.803 m (5' 11\")   Wt 82.8 kg (182 lb 8.7 oz)   SpO2 98%   BMI 25.46 kg/m    General Appearance:  Alert and interactive, NAD, resting comfortably in bed. Well nourished.  HEENT:  NC/AT. MMM, EOMI, Anicteric. Hearing intact to conversational volume.    Lungs: Unlabored breathing on room air.   Abdomen: Round, nondistended. Soft, nontender.   Extremities:  No lower extremity edema.   Skin: Warm and dry. No visible rashes, lesions or bruising.   Neuro:  Oriented x3, able to speak clearly.    Psych:   Cooperative, appropriate mood and affect, good eye contact          Data:     Lab Results   Component Value Date    A1C 10.4 02/11/2024     BMP  Recent Labs   Lab 02/13/24  0248 02/12/24  2239 02/12/24  1917 02/12/24  1509 02/12/24  0659 02/12/24  0604 02/12/24  0406 02/12/24  0157 02/11/24  2357 02/11/24  2209 02/11/24  1849 02/11/24  1800   NA  --   --   --   --   --  140 "  --  137  --  138  --  136   POTASSIUM  --   --   --   --   --  3.8  --  4.0  --  3.6  --  3.6   CHLORIDE  --   --   --   --   --  107  --  104  --  107  --  105   MARY  --   --   --   --   --  8.4*  --  8.2*  --  8.1*  --  8.3*   CO2  --   --   --   --   --  23  --  23  --  22  --  21*   BUN  --   --   --   --   --  21.7*  --  23.7*  --  24.3*  --  27.4*   CR  --   --   --   --   --  0.85  --  0.85  --  0.91  --  1.08   * 301* 165* 140*   < > 110*   < > 127*  133*   < > 128*   < > 121*  122*    < > = values in this interval not displayed.     CBC  Recent Labs   Lab 02/13/24  0650 02/12/24  0604 02/11/24  1110 02/11/24  0351   WBC 8.7 16.0* 23.9* 30.7*   RBC 4.87 4.48 4.52 4.73   HGB 14.3 13.3 13.6 14.1   HCT 41.5 38.5* 37.6* 41.1   MCV 85 86 83 87   MCH 29.4 29.7 30.1 29.8   MCHC 34.5 34.5 36.2 34.3   RDW 12.3 12.8 12.1 11.8    160 210 227       I spent a total of 50 minutes on the date of the encounter doing prep/post-work, chart review, history and exam, documentation and further activities per the note including lab review, multidisciplinary communication, counseling the patient and/or coordinating care regarding acute hyper/hypoglycemic management, as well as discharge management and planning/communication.  See note for details.

## 2024-02-14 VITALS
HEART RATE: 85 BPM | BODY MASS INDEX: 25.56 KG/M2 | TEMPERATURE: 97.6 F | OXYGEN SATURATION: 97 % | WEIGHT: 182.54 LBS | DIASTOLIC BLOOD PRESSURE: 80 MMHG | SYSTOLIC BLOOD PRESSURE: 120 MMHG | RESPIRATION RATE: 18 BRPM | HEIGHT: 71 IN

## 2024-02-14 LAB
ANION GAP SERPL CALCULATED.3IONS-SCNC: 10 MMOL/L (ref 7–15)
ANION GAP SERPL CALCULATED.3IONS-SCNC: 10 MMOL/L (ref 7–15)
ANION GAP SERPL CALCULATED.3IONS-SCNC: 12 MMOL/L (ref 7–15)
ANION GAP SERPL CALCULATED.3IONS-SCNC: 12 MMOL/L (ref 7–15)
ANION GAP SERPL CALCULATED.3IONS-SCNC: 7 MMOL/L (ref 7–15)
ANION GAP SERPL CALCULATED.3IONS-SCNC: 8 MMOL/L (ref 7–15)
ANION GAP SERPL CALCULATED.3IONS-SCNC: 9 MMOL/L (ref 7–15)
BUN SERPL-MCNC: 13.3 MG/DL (ref 6–20)
BUN SERPL-MCNC: 13.5 MG/DL (ref 6–20)
BUN SERPL-MCNC: 13.6 MG/DL (ref 6–20)
BUN SERPL-MCNC: 13.6 MG/DL (ref 6–20)
BUN SERPL-MCNC: 14.3 MG/DL (ref 6–20)
BUN SERPL-MCNC: 14.4 MG/DL (ref 6–20)
BUN SERPL-MCNC: 14.5 MG/DL (ref 6–20)
BUN SERPL-MCNC: 15.4 MG/DL (ref 6–20)
BUN SERPL-MCNC: 16.8 MG/DL (ref 6–20)
CALCIUM SERPL-MCNC: 8.8 MG/DL (ref 8.6–10)
CALCIUM SERPL-MCNC: 8.9 MG/DL (ref 8.6–10)
CALCIUM SERPL-MCNC: 9 MG/DL (ref 8.6–10)
CALCIUM SERPL-MCNC: 9.1 MG/DL (ref 8.6–10)
CALCIUM SERPL-MCNC: 9.1 MG/DL (ref 8.6–10)
CALCIUM SERPL-MCNC: 9.2 MG/DL (ref 8.6–10)
CALCIUM SERPL-MCNC: 9.2 MG/DL (ref 8.6–10)
CHLORIDE SERPL-SCNC: 101 MMOL/L (ref 98–107)
CHLORIDE SERPL-SCNC: 102 MMOL/L (ref 98–107)
CHLORIDE SERPL-SCNC: 103 MMOL/L (ref 98–107)
CHLORIDE SERPL-SCNC: 103 MMOL/L (ref 98–107)
CHLORIDE SERPL-SCNC: 105 MMOL/L (ref 98–107)
CREAT SERPL-MCNC: 0.6 MG/DL (ref 0.67–1.17)
CREAT SERPL-MCNC: 0.61 MG/DL (ref 0.67–1.17)
CREAT SERPL-MCNC: 0.63 MG/DL (ref 0.67–1.17)
CREAT SERPL-MCNC: 0.63 MG/DL (ref 0.67–1.17)
CREAT SERPL-MCNC: 0.64 MG/DL (ref 0.67–1.17)
CREAT SERPL-MCNC: 0.69 MG/DL (ref 0.67–1.17)
CREAT SERPL-MCNC: 0.69 MG/DL (ref 0.67–1.17)
DEPRECATED HCO3 PLAS-SCNC: 24 MMOL/L (ref 22–29)
DEPRECATED HCO3 PLAS-SCNC: 25 MMOL/L (ref 22–29)
DEPRECATED HCO3 PLAS-SCNC: 25 MMOL/L (ref 22–29)
DEPRECATED HCO3 PLAS-SCNC: 26 MMOL/L (ref 22–29)
DEPRECATED HCO3 PLAS-SCNC: 27 MMOL/L (ref 22–29)
DEPRECATED HCO3 PLAS-SCNC: 28 MMOL/L (ref 22–29)
DEPRECATED HCO3 PLAS-SCNC: 28 MMOL/L (ref 22–29)
EGFRCR SERPLBLD CKD-EPI 2021: >90 ML/MIN/1.73M2
ERYTHROCYTE [DISTWIDTH] IN BLOOD BY AUTOMATED COUNT: 12 % (ref 10–15)
GLUCOSE BLDC GLUCOMTR-MCNC: 106 MG/DL (ref 70–99)
GLUCOSE BLDC GLUCOMTR-MCNC: 111 MG/DL (ref 70–99)
GLUCOSE BLDC GLUCOMTR-MCNC: 115 MG/DL (ref 70–99)
GLUCOSE BLDC GLUCOMTR-MCNC: 123 MG/DL (ref 70–99)
GLUCOSE BLDC GLUCOMTR-MCNC: 127 MG/DL (ref 70–99)
GLUCOSE BLDC GLUCOMTR-MCNC: 128 MG/DL (ref 70–99)
GLUCOSE BLDC GLUCOMTR-MCNC: 131 MG/DL (ref 70–99)
GLUCOSE BLDC GLUCOMTR-MCNC: 149 MG/DL (ref 70–99)
GLUCOSE BLDC GLUCOMTR-MCNC: 156 MG/DL (ref 70–99)
GLUCOSE BLDC GLUCOMTR-MCNC: 190 MG/DL (ref 70–99)
GLUCOSE BLDC GLUCOMTR-MCNC: 84 MG/DL (ref 70–99)
GLUCOSE BLDC GLUCOMTR-MCNC: 95 MG/DL (ref 70–99)
GLUCOSE SERPL-MCNC: 110 MG/DL (ref 70–99)
GLUCOSE SERPL-MCNC: 115 MG/DL (ref 70–99)
GLUCOSE SERPL-MCNC: 121 MG/DL (ref 70–99)
GLUCOSE SERPL-MCNC: 129 MG/DL (ref 70–99)
GLUCOSE SERPL-MCNC: 136 MG/DL (ref 70–99)
GLUCOSE SERPL-MCNC: 140 MG/DL (ref 70–99)
GLUCOSE SERPL-MCNC: 153 MG/DL (ref 70–99)
GLUCOSE SERPL-MCNC: 190 MG/DL (ref 70–99)
GLUCOSE SERPL-MCNC: 195 MG/DL (ref 70–99)
HCT VFR BLD AUTO: 40 % (ref 40–53)
HGB BLD-MCNC: 14 G/DL (ref 13.3–17.7)
HOLD SPECIMEN: NORMAL
MAGNESIUM SERPL-MCNC: 1.8 MG/DL (ref 1.7–2.3)
MCH RBC QN AUTO: 29.5 PG (ref 26.5–33)
MCHC RBC AUTO-ENTMCNC: 35 G/DL (ref 31.5–36.5)
MCV RBC AUTO: 84 FL (ref 78–100)
PHOSPHATE SERPL-MCNC: 2.7 MG/DL (ref 2.5–4.5)
PLATELET # BLD AUTO: 151 10E3/UL (ref 150–450)
POTASSIUM SERPL-SCNC: 3.5 MMOL/L (ref 3.4–5.3)
POTASSIUM SERPL-SCNC: 3.7 MMOL/L (ref 3.4–5.3)
POTASSIUM SERPL-SCNC: 3.7 MMOL/L (ref 3.4–5.3)
POTASSIUM SERPL-SCNC: 3.8 MMOL/L (ref 3.4–5.3)
POTASSIUM SERPL-SCNC: 4 MMOL/L (ref 3.4–5.3)
POTASSIUM SERPL-SCNC: 4.1 MMOL/L (ref 3.4–5.3)
RBC # BLD AUTO: 4.75 10E6/UL (ref 4.4–5.9)
SODIUM SERPL-SCNC: 136 MMOL/L (ref 135–145)
SODIUM SERPL-SCNC: 137 MMOL/L (ref 135–145)
SODIUM SERPL-SCNC: 137 MMOL/L (ref 135–145)
SODIUM SERPL-SCNC: 138 MMOL/L (ref 135–145)
SODIUM SERPL-SCNC: 140 MMOL/L (ref 135–145)
SODIUM SERPL-SCNC: 141 MMOL/L (ref 135–145)
WBC # BLD AUTO: 5.9 10E3/UL (ref 4–11)

## 2024-02-14 PROCEDURE — 99207 PR NO BILLABLE SERVICE THIS VISIT: CPT | Performed by: STUDENT IN AN ORGANIZED HEALTH CARE EDUCATION/TRAINING PROGRAM

## 2024-02-14 PROCEDURE — 80048 BASIC METABOLIC PNL TOTAL CA: CPT | Performed by: STUDENT IN AN ORGANIZED HEALTH CARE EDUCATION/TRAINING PROGRAM

## 2024-02-14 PROCEDURE — 36415 COLL VENOUS BLD VENIPUNCTURE: CPT | Performed by: STUDENT IN AN ORGANIZED HEALTH CARE EDUCATION/TRAINING PROGRAM

## 2024-02-14 PROCEDURE — 99239 HOSP IP/OBS DSCHRG MGMT >30: CPT | Performed by: STUDENT IN AN ORGANIZED HEALTH CARE EDUCATION/TRAINING PROGRAM

## 2024-02-14 PROCEDURE — 83735 ASSAY OF MAGNESIUM: CPT | Performed by: STUDENT IN AN ORGANIZED HEALTH CARE EDUCATION/TRAINING PROGRAM

## 2024-02-14 PROCEDURE — 250N000013 HC RX MED GY IP 250 OP 250 PS 637: Performed by: STUDENT IN AN ORGANIZED HEALTH CARE EDUCATION/TRAINING PROGRAM

## 2024-02-14 PROCEDURE — 84100 ASSAY OF PHOSPHORUS: CPT | Performed by: STUDENT IN AN ORGANIZED HEALTH CARE EDUCATION/TRAINING PROGRAM

## 2024-02-14 PROCEDURE — 99233 SBSQ HOSP IP/OBS HIGH 50: CPT | Performed by: STUDENT IN AN ORGANIZED HEALTH CARE EDUCATION/TRAINING PROGRAM

## 2024-02-14 PROCEDURE — 36415 COLL VENOUS BLD VENIPUNCTURE: CPT

## 2024-02-14 PROCEDURE — 85027 COMPLETE CBC AUTOMATED: CPT

## 2024-02-14 PROCEDURE — 250N000012 HC RX MED GY IP 250 OP 636 PS 637: Performed by: STUDENT IN AN ORGANIZED HEALTH CARE EDUCATION/TRAINING PROGRAM

## 2024-02-14 PROCEDURE — 250N000009 HC RX 250: Performed by: STUDENT IN AN ORGANIZED HEALTH CARE EDUCATION/TRAINING PROGRAM

## 2024-02-14 RX ORDER — MAGNESIUM OXIDE 400 MG/1
400 TABLET ORAL EVERY 4 HOURS
Status: COMPLETED | OUTPATIENT
Start: 2024-02-14 | End: 2024-02-14

## 2024-02-14 RX ORDER — INSULIN LISPRO 100 [IU]/ML
INJECTION, SOLUTION INTRAVENOUS; SUBCUTANEOUS
COMMUNITY
Start: 2024-02-14

## 2024-02-14 RX ORDER — MAGNESIUM OXIDE 400 MG/1
400 TABLET ORAL EVERY 4 HOURS
Status: DISCONTINUED | OUTPATIENT
Start: 2024-02-14 | End: 2024-02-14

## 2024-02-14 RX ORDER — LANCETS
EACH MISCELLANEOUS
Qty: 100 EACH | Refills: 11 | Status: SHIPPED | OUTPATIENT
Start: 2024-02-14

## 2024-02-14 RX ORDER — INSULIN LISPRO 100 [IU]/ML
120 INJECTION, SOLUTION INTRAVENOUS; SUBCUTANEOUS
Qty: 10 ML | Refills: 1 | Status: SHIPPED | OUTPATIENT
Start: 2024-02-14

## 2024-02-14 RX ORDER — POTASSIUM CHLORIDE 750 MG/1
20 TABLET, EXTENDED RELEASE ORAL ONCE
Status: COMPLETED | OUTPATIENT
Start: 2024-02-14 | End: 2024-02-14

## 2024-02-14 RX ADMIN — POTASSIUM & SODIUM PHOSPHATES POWDER PACK 280-160-250 MG 1 PACKET: 280-160-250 PACK at 18:27

## 2024-02-14 RX ADMIN — INSULIN HUMAN 0.5 UNITS/HR: 1 INJECTION, SOLUTION INTRAVENOUS at 09:22

## 2024-02-14 RX ADMIN — INSULIN LISPRO 1 VIAL: 100 INJECTION, SOLUTION INTRAVENOUS; SUBCUTANEOUS at 11:22

## 2024-02-14 RX ADMIN — POTASSIUM & SODIUM PHOSPHATES POWDER PACK 280-160-250 MG 1 PACKET: 280-160-250 PACK at 14:38

## 2024-02-14 RX ADMIN — MAGNESIUM OXIDE TAB 400 MG (241.3 MG ELEMENTAL MG) 400 MG: 400 (241.3 MG) TAB at 05:18

## 2024-02-14 RX ADMIN — INSULIN HUMAN 0 UNITS/HR: 1 INJECTION, SOLUTION INTRAVENOUS at 08:27

## 2024-02-14 RX ADMIN — POTASSIUM CHLORIDE 20 MEQ: 750 TABLET, EXTENDED RELEASE ORAL at 14:38

## 2024-02-14 RX ADMIN — MAGNESIUM OXIDE TAB 400 MG (241.3 MG ELEMENTAL MG) 400 MG: 400 (241.3 MG) TAB at 08:34

## 2024-02-14 ASSESSMENT — ACTIVITIES OF DAILY LIVING (ADL)
ADLS_ACUITY_SCORE: 18

## 2024-02-14 NOTE — PROGRESS NOTES
[facility]    Medicine Progress Note - Hospitalist Service, GOLD TEAM 21    Date of Admission:  2/11/2024    Assessment & Plan   Amaury Healy is a 24 year old male admitted on 2/11/2024 who is being transferred out of the ICU on 2/12/2024. He has a history of type 1 diabetes. Transitioned to PTA insulin pump on transfer out of the ICU but developed recurrent hyperglycemia with gap morning of 2/13 requiring transition back to insulin gtt. Endocrine following. He is transitioned back to insulin pump on 2/14 with plan to monitor this afternoon. If stable, he is stable to discharge evening of 2/14.     # DM, type 1  # DKA - recurrent, improved   Most recent A1C was 9.4 n 10/2020, A1C now 10.9 on arrival to ICU. Patient has implanted pump and glucometer (currently off). Initial AG of 28, BG of 494, Ketones of 7.9, and received 3 L of fluid in the ED. Etiology of DKA unclear. No localizing infection by history and he otherwise feels well with improvement of his sugars.     Unfortunately, recurrent hyperglycemia overnight and into the 300's on 2/13 with recurrent gap and elevated beta hydroxybutyrate requiring resumption of insulin gtt. He quickly improved and feels well. Transitioned back to insulin pump this afternoon and will monitor. He may discharge this evening is stable.   - Endocrinology following, appreciate recommendations  - Transition to pump, stop insulin gtt 30 minutes later - orders per endo    - lispro basal + bolus    - continue carb coverage at 1 per 6CHO   - close glucose monitoring Q2 hours for now   - Moderate carb consistent diet  - Please see endocrinology note for details regarding insulin plan if BG >250 x 4 hours     #Nausea, vomiting, abdominal pain, secondary to above, resolved  - management per above     #PUMA, resolved   Prerenal in the setting of DKA.   - trend Cr    #Leukocytosis, resolved  No localizing infectious etiology per history, exam or  "workup. Likely stress demargination in the setting of above. No antibiotics given.             Diet: Moderate Consistent Carb (60 g CHO per Meal) Diet    DVT Prophylaxis: Low Risk/Ambulatory with no VTE prophylaxis indicated  Castillo Catheter: Not present  Lines: None     Cardiac Monitoring: None  Code Status: Full Code      Clinically Significant Risk Factors                         # Overweight: Estimated body mass index is 25.46 kg/m  as calculated from the following:    Height as of this encounter: 1.803 m (5' 11\").    Weight as of this encounter: 82.8 kg (182 lb 8.7 oz)., PRESENT ON ADMISSION            Disposition Plan      Expected Discharge Date: 02/14/2024                    Cecelia Parmar, DO  Hospitalist Service, GOLD TEAM 21  [facility]  Securely message with [employer] (more info)  Text page via [facility] Paging/Directory   See signed in provider for up to date coverage information  ______________________________________________________________________    Interval History   No acute events overnight. He continues to feel well. Tolerating PO and denies nausea, vomiting, dyspnea, abdominal pain, change in bowel habits, urinary symptoms, joint pain or swelling or other concerns.     Physical Exam   Vital Signs: Temp: 98.2  F (36.8  C) Temp src: Oral BP: 117/79 Pulse: 66   Resp: 18 SpO2: 98 % O2 Device: None (Room air)    Weight: 182 lbs 8.65 oz    General: well developed, awake, alert, no acute distress  HEENT: sclera clear, MMM  CV: RRR, no m/r/g. Extremities are warm and well perfused.   LUNGS: CTAB, no w/r/c.  ABD: Soft, NT/ND, NBS, no masses or organomegaly.  SKIN: Warm, well perfused. No skin rashes or abnormal lesions.  MSK: Normal gait. No deformities.  NEURO: Ambulating with no limitations. No focal deficits.      Medical Decision Making           Data     I have personally reviewed the following data over the past 24 hrs:    5.9  \   14.0   / 151     137 102 " 14.4 /  156 (H)   3.5 26 0.64 (L) \         used

## 2024-02-14 NOTE — CONSULTS
Diabetes CNS consult follow up this am to reinitiate ambulatory insulin infusion pump.  Past medical history significant for DM type 1.  He was admitted on 2/11/2024 for DKA after presenting to the ED reporting a one day history of nausea and vomiting.   Currently on IV insulin; request is for him to transition back to his ambulatory insulin infusion pump this morning.  Spoke with Wanda Aden PA-C, and reviewed orders and treatment plan.    Ambulatory Insulin Infusion Pump  Diagnosis Type 1 DM at age 9, 1/2010; pump therapy initiated 2014 at age 13  Pump-Medtronic 770 G; MD suggested to upgrade to 780 at last visit  Reservoir-3 ml  Infusion Set-Quik Set; please note Amaury doesn't have an  for the quik-sets.  He said he got these by accident; typically he uses the Mayur 6 mm cannula infusion set.  Got these by accident with ordering.  CGM-none; does not like the Medtronic Guardian that could communicate with his pump.  Insulin-Lispro (Humalog)  Pediatric Endocrinologist-Dr Emanuel Ann; last seen on 6/1/2023; [facility] Endocrinology     Basal rates:   6164-8979 --> 1.5 units/hour  4709-2966 am--> 1.6 units/hour  6981-6097--> 1.65 units/hour     Total Basal=38.7 units/24 hours     Insulin to carb ratio  8437-9533-1/8 grams of CHO-Changed to 1:6 grams  0266-2511-1/8 grams of CHO-Changed to 1:6 grams  5340-5779-1/8 grams of CHO-Changed to 1:6 grams  3121-6537-1/8 grams of CHO-Changed to 1:6 grams     ISF/sensitivity  7091-5474-1/35 mg/dL  9749-5806-1/30 mg/dL-Changed to 1/25 mg/dL        BG Target:   2028-4201-411-150 mg/dL    4918-0357-  mg/dL  6378-5021- 100-150 mg/dL     Active Insulin Time:  3 hours    Amaury and his significant other present in room.  He is ready to resume his pump therapy and is anxious to discharge this evening.  Programmed changes in pump as ordered.  Utilities self-test run on pump.  All systems check and in good working order.  ELLEN Jensen, brought vial of  insulin into patient's room for pump initiation.    Amaury was able to independently rewind piston, fill reservoir with Humalog, place reservoir into pump and attach infusion set/tubing, prime tubing, place set in anterior upper left thigh, fill cannula, initiate pump in manual mode.    Two things Amaury needs to do:  Site selection and rotation-Amaury basically only uses his thighs for pump sites.  He does have a couple of noticeable healing pump sites.  There is a small amount of lipodystrophy on left thigh, minimal of right.  Encouraged him to use his abdomen.  He doesn't like to do this because of work and worried things will catch on it and pull it out, in addition to not being very comfortable with this  He did agree to try to use abdomen every third site change.  Amaury likes the Mayur infusion set and accidentally received the Quik Set without an  last order.  He states he only has 2 of these left.  Informed him to call [employer] to obtain a free  as it should have been provided when those quik sets were shipped.  He is hesitant with the manual insertion and should get his [place]'s ordered asap.    Pump initiated at 11:30 am with infusion set placed on left anterior thigh.  ELLEN Jensen, documented this on EMAR and placed LDA avatar in flowsheets for site assessment.  IV insulin to be discontinued/stopped at 1230.    Glucoses to be checked every 2 hours.  Wanda Aden PA-C, Inpatient Diabetes Service, has orders in and plan for if glucoses start to rise > 250 mg/dL X 2 over 4 hours.    Patient hoping to discharge later today.    GAUDENCIO Samuels  Diabetes Clinical Nurse Specialist/[facility]  [phone]

## 2024-02-14 NOTE — DISCHARGE SUMMARY
"[facility]  Hospitalist Discharge Summary      Date of Admission:  2/11/2024  Date of Discharge:  2/14/2024  Discharging Provider: Cecelia Parmar DO  Discharge Service: Hospitalist Service, GOLD TEAM 21    Discharge Diagnoses   DM1  DKA, resolved   PUMA, resolved     Clinically Significant Risk Factors     # Overweight: Estimated body mass index is 25.46 kg/m  as calculated from the following:    Height as of this encounter: 1.803 m (5' 11\").    Weight as of this encounter: 82.8 kg (182 lb 8.7 oz).       Follow-ups Needed After Discharge   Follow-up Appointments     Adult [facility] Follow-up and recommended labs and tests      Follow up with primary care provider, Physician No Ref-Primary, within 7   days for hospital follow- up.  The following labs/tests are recommended:   glucose check and basic metabolic panel.      Appointments on [place] and/or [facility] (with [facility] or [facility]   provider or service). Call [phone] if you haven't heard regarding   these appointments within 7 days of discharge.            Unresulted Labs Ordered in the Past 30 Days of this Admission       Date and Time Order Name Status Description    2/11/2024  3:02 PM Blood Culture Hand, Right Preliminary     2/11/2024  3:02 PM Blood Culture Hand, Left Preliminary         These results will be followed up by hospitalist service.     Discharge Disposition   Discharged to home  Condition at discharge: Stable    Hospital Course   Amaury Healy is a 24 year old male admitted on 2/11/2024 who is being transferred out of the ICU on 2/12/2024. He has a history of type 1 diabetes. Transitioned to PTA insulin pump on transfer out of the ICU but developed recurrent hyperglycemia with gap morning of 2/13 requiring transition back to insulin gtt. Endocrine following. He is transitioned back to insulin pump on 2/14 and was monitored over several hours. Blood sugars remained stable on insulin pump and " he is stable for discharge with close endo follow up. Noted CGMs not covered under his pharmacy benefits and will need to be readdressed as an outpatient.     # DM, type 1  # DKA - recurrent, resolved  Most recent A1C was 9.4 n 10/2020, A1C now 10.9 on arrival to ICU. Patient has implanted pump and glucometer (currently off). Initial AG of 28, BG of 494, Ketones of 7.9, and received 3 L of fluid in the ED. Etiology of DKA unclear. No localizing infection by history and he otherwise feels well with improvement of his sugars. Recurrent hyperglycemia overnight and into the 300's on 2/13 with recurrent gap and elevated beta hydroxybutyrate requiring resumption of insulin gtt. He quickly improved with insulin gtt and remained otherwise clinically asymptomatic. He was transitioned back to insulin pump afternoon of 2/14 with stable glucose and cleared for discharge. Supplies ordered to discharge pharmacy here but closed. He confirms he has supplies at home, including back up long acting insulin if his pump were to fail, and he will  supplies at our pharmacy tomorrow.   - Blood cultures - NGTD   - Endocrinology consulted and assisted in management while inpatient  - PTA insulin pump including the following adjustments:   Pump type: Metronic 770 Minmed   Pump insulin: Humalog       Basal rates:   0000 - 0300: 1.50 units/hr  0300 - 1200: 1.60 units/hr  1200 - 0000:  1.65 units/hr     Total Basal=38.7 units/24 hours     Insulin to carb ratio  0000 - 2359: 1 unit per 8 g CHO --> 1 unit per 6 g CHO     ISF/sensitivity  0000 - 0800: 35  0800 - 2359: 30 --> 25     BG Target:   0000 - 0800: 100 - 150  0500 -2030: 90 - 130  2030 - 2359: 100 - 150     Supplies Provided on Discharge: Accu-chek multi-clicks lancing device with multi-click drums, Countour Next one test strips. Lantus pens, Humalog vials      #Nausea, vomiting, abdominal pain, secondary to above, resolved  Secondary to DKA and quickly resolved with improvement of  his blood sugars     #PUMA, resolved   Prerenal in the setting of DKA.     #Leukocytosis, resolved  No localizing infectious etiology per history, exam or workup. Likely stress demargination in the setting of above. No antibiotics given.       Consultations This Hospital Stay   ENDOCRINE DIABETES ADULT IP CONSULT  CNS DIABETES IP CONSULT  DIABETES EDUCATION IP CONSULT  PHARMACY LIAISON FOR MEDICATION COVERAGE CONSULT    Code Status   Full Code    Time Spent on this Encounter   ICecelia DO, personally saw the patient today and spent greater than 30 minutes discharging this patient.       Cecelia Parmar DO  [facility] [address]  [address] [address]  Phone: [phone]  Fax: [phone]  ______________________________________________________________________    Physical Exam   Vital Signs: Temp: 97.6  F (36.4  C) Temp src: Oral BP: 120/80 Pulse: 85   Resp: 18 SpO2: 97 % O2 Device: None (Room air)    Weight: 182 lbs 8.65 oz  General: well developed, awake, alert, no acute distress  HEENT: sclera clear, MMM  CV: RRR, no m/r/g. Extremities are warm and well perfused.   LUNGS: CTAB, no w/r/c.  ABD: Soft, NT/ND, NBS, no masses or organomegaly.  SKIN: Warm, well perfused. No skin rashes or abnormal lesions.  MSK: Normal gait. No deformities.  NEURO: Ambulating with no limitations. No focal deficits.       Primary Care Physician   Physician No Ref-Primary    Discharge Orders      Reason for your hospital stay    You were hospitalized for management of diabetic ketoacidosis due to elevated blood sugars, requiring a continuous insulin infusion through the IV and ICU level care initially. Your sugars have now improved. Endocrinology was consulted to assist with glucose management. You are now stable on your insulin pump and are safe to discharge with close follow up.     Activity    Your activity upon discharge: activity as tolerated     Adult [facility] Follow-up and recommended labs and tests     Follow up with primary care provider, Physician No Ref-Primary, within 7 days for hospital follow- up.  The following labs/tests are recommended: glucose check and basic metabolic panel.      Appointments on [place] and/or [facility] (with [facility] or [facility] provider or service). Call [phone] if you haven't heard regarding these appointments within 7 days of discharge.     Diet    Follow this diet upon discharge: Orders Placed This Encounter      Moderate Consistent Carb (60 g CHO per Meal) Diet       Significant Results and Procedures   Most Recent 3 CBC's:  Recent Labs   Lab Test 02/14/24  0405 02/13/24  0650 02/12/24  0604   WBC 5.9 8.7 16.0*   HGB 14.0 14.3 13.3   MCV 84 85 86    160 160     Most Recent 3 BMP's:  Recent Labs   Lab Test 02/14/24  1458 02/14/24  1434 02/14/24  1259 02/14/24  1238 02/14/24  1018 02/14/24  1010   NA  --  137  --  136  --  140  138   POTASSIUM  --  3.5  --  3.8  --  3.7  3.8   CHLORIDE  --  102  --  101  --  103  102   CO2  --  26  --  25  --  25  24   BUN  --  14.4  --  13.6  --  13.5  13.6   CR  --  0.64*  --  0.64*  --  0.60*  0.61*   ANIONGAP  --  9  --  10  --  12  12   MARY  --  9.1  --  9.2  --  9.0  9.2   * 153* 149* 140*   < > 190*  195*    < > = values in this interval not displayed.   , No results found for this or any previous visit.    Discharge Medications   Current Discharge Medication List        CONTINUE these medications which have NOT CHANGED    Details   insulin lispro (HUMALOG VIAL) 100 UNIT/ML vial Inject subcutaneously via ambulatory insulin pump.  Continuous rate:  1147-5767: 1.5 units/hour  2673-6181: 1.6 units/hour  9340-6004: 1.65 units/hour    Carb Coverage: 1 unit per 8 grams carbohydrates    Insulin sensitivity factor:  0668-6580: 35  4301-3134: 30    Blood glucose goals:  4048-0637: 100-150  1624-7454:   4906-6803: 100-150           Allergies   No Known Allergies

## 2024-02-14 NOTE — PROGRESS NOTES
Inpatient Diabetes Management Service: Daily Progress Note     HPI: Amaury Healy is a 24 year old male with a medical history significant for DM type 1 who was admitted on 2/11/2024 for DKA after presenting to the ED reporting a one day history of nausea and vomiting.             Assessment/Plan:     Assessment:   Type 1 Diabetes Mellitus, uses ambulatory insulin pump, no known complications, poor control  (A1c 10.4 %) on 2/11/2024.  DKA, initially resolved 2/11. DKA reoccurred 2/13 after restarting home insulin pump 2/12, resolved later 2/13 after resumption of IV insulin.    Plan/Recommendations:    - Stop dextrose fluids and DKA IV insulin gtt 0800. Start non-DKA IV insulin gtt 0800 --> discontinue 1 hr after resuming home insulin pump.  - Resume home insulin pump at 1100 with the following setting adjustments:  Pump type: Metronic 770 Minmed   Pump insulin: Humalog      Basal rates:   0000 - 0300: 1.50 units/hr  0300 - 1200: 1.60 units/hr  1200 - 0000:  1.65 units/hr     Total Basal=38.7 units/24 hours     Insulin to carb ratio  0000 - 2359: 1 unit per 8 g CHO --> 1 unit per 6 g CHO     ISF/sensitivity  0000 - 0800: 35  0800 - 2359: 30 --> 25     BG Target:   0000 - 0800: 100 - 150  0500 -2030: 90 - 130  2030 - 2359: 100 - 150    Active insulin time: 3 hours   - BG monitoring after IV insulin stopped: q2 hr to monitor BG closely after starting home insulin pump and check that BG is not rising too rapidly.   - *Closely monitor for rise in BG after resuming home insulin pump. If BG >250 x 4 hours... take off pump and start insulin injections with the follow back-up plan:   Lantus 30 units every 24 hours   Novolog carb coverage: 1 unit per 6 g CHO   Novolog correction scale: 1:25 > 140 three times daily before meals; 1:25 >200 at bedtime.   Pre-meal:   Do Not give Correction Insulin if Pre-Meal BG less than 140.    For Pre-Meal  - 164 give 1 unit.    For Pre-Meal  - 189 give  2 units.    For Pre-Meal  - 214 give 3 units.    For Pre-Meal  - 239 give 4 units.    For Pre-Meal  - 264 give 5 units.    For Pre-Meal  - 289 give 6 units.    For Pre-Meal  - 314 give 7 units.    For Pre-Meal  - 339 give 8 units.    For Pre-Meal  - 364 give 9 units.    For Pre-Meal BG greater than or equal to 365 give 10 units   To be given with prandial insulin, and based on pre-meal blood glucose.      Bedtime:   Do Not give Bedtime Correction Insulin if BG less than 200.    For  - 224 give 1 units.    For  - 249 give 2 units.    For  - 274 give 3 units.    For  - 299 give 4 units.    For  - 324 give 5 units.    For  - 349 give 6 units.    For BG greater than or equal to 350 give 7 units.    - Hypoglycemia protocol  - Carb counting protocol     Discussion: BG stabilized and labs stabilized on IV insulin. Will transition back to insulin pump today. BG remained stable after resuming pump, discharging home this evening.     Discharge Recommendations:   Medications: humalog via home insulin pump. Back-up plan for pump failure as noted above.   BG monitoring: TID AC, HS. Glucometer. Encouraged to start on CGM outpatient.   Test Claims: CGM coverage requested 2/13, medical benefit not pharmacy benefit - patient to figure out outpatient.   Education: Completed 2/13 and 2/14.   Outpatient Follow-up: [facility] Pediatric Endocrinologist -  Dr. Emanuel Ann, last visit 6/1/2023. Plans to meet with Dr. Ann one more time then transition to adult Endocrinology.   Supplies Needed for Discharge: Accu-chek multi-clicks lancing device with multi-click drums, Countour Next one test strips. Lantus pens, Humalog vials     Plan discussed with patient, bedside RN, and primary team via this note.    Please notify Inpatient Diabetes Service if changes are planned to steroids, nutrition, TPN/TF and anticipated procedures requiring prolonged NPO status.      Inpatient Diabetes Service will continue to follow, please don't hesitate to contact the team with any questions or concerns.     Wanda Aden PA-C  Inpatient Diabetes Service  Pager: [phone]  Available on [employer]      To contact Inpatient Diabetes Service:     7 AM - 5 PM: Page the IDS SAADIA following the patient that day (see filed or incomplete progress notes/consult notes under Endocrinology)    OR if uncertain of provider assignment: page job code [phone]    5 PM - 7 AM: First call after hours is to primary service.    For urgent after-hours questions, page job code for on call fellow: [phone]           Interval History/Review of Systems :   - The last 24 hours progress and nursing notes reviewed.  - Patient feels well today, eager to discharge home.   - Met with diabetes education yesterday, patient doesn't have  for quik-sets... wonders if this caused elevated BG when restarting pump the day prior.     Planned Procedures/Surgeries: none    Inpatient Glucose Control:              Medications Impacting Glycemia:   Steroids:  none   D5W-containing solutions/medications: D5 and NaCl infusion at 75 mL/hr 2/11 - 2/12 (stopped 1200).         Nutrition:   Orders Placed This Encounter      Moderate Consistent Carb (60 g CHO per Meal) Diet  Supplements: none   TF/TPN: none         Diabetes History: see full consult note for complete diabetes history   Diabetes Type and Duration: T1DM, since 9 years old     PTA Medication Regimen:   Amb pump:   - settings verified on pump 2/12/2024.   Pump type: Metronic 770 Minmed per chart, cannot see model on pump    Pump insulin: Humalog      Basal rates:   0000 - 0300: 1.50 units/hr  0300 - 1200: 1.60 units/hr  1200 - 0000:  1.65 units/hr     Total Basal=38.7 units/24 hours     Insulin to carb ratio  0000 - 2359: 1 unit per 8 g CHO     ISF/sensitivity  0000 - 0800: 35  0800 - 2359: 30     BG Target:   0000 - 0800: 100 - 150  0500 -2030: 90 - 130  2030 - 2359: 100 -  "150    Active insulin time: 3 hours     Glucose monitoring device and frequency: fingers sticks--maybe once or twice daily   Outpatient Diabetes Provider: [facility] Pediatric Endocrinologist -  Dr. Emanuel Ann, last visit 6/1/2024.        Physical Exam:   /76 (BP Location: Left arm)   Pulse 89   Temp 97.6  F (36.4  C) (Oral)   Resp 16   Ht 1.803 m (5' 11\")   Wt 82.8 kg (182 lb 8.7 oz)   SpO2 96%   BMI 25.46 kg/m    General Appearance:  Alert and interactive, NAD, resting comfortably in bed. Well nourished. Girlfriend at bedside.   HEENT:  NC/AT. MMM, EOMI, Anicteric. Hearing intact to conversational volume.    Lungs: Unlabored breathing on room air.   Abdomen: Round, nondistended. Soft, nontender.   Extremities:  No lower extremity edema.   Skin: Warm and dry. No visible rashes, lesions or bruising.   Neuro:  Oriented x3, able to speak clearly.    Psych:   Cooperative, appropriate mood and affect, good eye contact          Data:     Lab Results   Component Value Date    A1C 10.4 02/11/2024     BMP  Recent Labs   Lab 02/14/24  0658 02/14/24  0556 02/14/24  0405 02/14/24  0402 02/14/24  0211 02/14/24  0125 02/14/24  0006     --  138  --  138  --  137   POTASSIUM 3.8  --  3.8  --  4.1  --  4.0   CHLORIDE 105  --  103  --  102  --  102   MARY 8.9  --  8.8  --  8.8  --  8.8   CO2 27  --  26  --  28  --  28   BUN 13.3  --  14.5  --  15.4  --  16.8   CR 0.69  --  0.63*  --  0.64*  --  0.63*   * 128* 129* 123* 121*   < > 115*    < > = values in this interval not displayed.     CBC  Recent Labs   Lab 02/14/24  0405 02/13/24  0650 02/12/24  0604 02/11/24  1110   WBC 5.9 8.7 16.0* 23.9*   RBC 4.75 4.87 4.48 4.52   HGB 14.0 14.3 13.3 13.6   HCT 40.0 41.5 38.5* 37.6*   MCV 84 85 86 83   MCH 29.5 29.4 29.7 30.1   MCHC 35.0 34.5 34.5 36.2   RDW 12.0 12.3 12.8 12.1    160 160 210     I spent a total of 55 minutes on the date of the encounter doing prep/post-work, chart review, history and " exam, documentation and further activities per the note including lab review, multidisciplinary communication, counseling the patient and/or coordinating care regarding acute hyper/hypoglycemic management, as well as discharge management and planning/communication.  See note for details.

## 2024-02-14 NOTE — PLAN OF CARE
Goal Outcome Evaluation:    Pt is A/Ox4. Vss, afebrile. Denies pain and sob. Up ad alexus in room. Good appetite. Insulin gtt stopped at 1230. Resumed insulin pump. Glucose to be checked every two hours. Calm and cooperative with cares. Call light within reach. Able to make needs known.    DISCHARGE SUMMARY    Pt discharging to: home  Transportation: family  AVS given and discussed: Pt was given AVS and pt states understanding of content. Pt has no further questions.   Stoplight Tool given and discussed: NA  Medications given: No, pt will pick it up tomorrow at the [place] pharmacy  Belongings returned: Yes, ensured all belongings packed and sent with pt. No items in security.   Comments: Pt left the unit with his belongings safely at 1857.

## 2024-02-14 NOTE — PLAN OF CARE
Goal Outcome Evaluation: Hyperglycemia    Plan of Care Reviewed With: patient  Overall Patient Progress: improving    A/Ox4, ambulatory and independent. Denied dizziness and light-headedness. Tolerating moderate consistent carb (60 g CHO per Meal) Diet.  Denied pain, nausea/vomiting. VS WNL.     Blood sugar has been stable in the target range (100-150) since 2300 with latest result of 128 done at 0600. Monitoring is q2 hours now per DKA protocol. Insulin drip in ALG# 3 at 3 units/hr. K+ at 0400 was 3.8, BMP monitoring q2, Potassium to maintain bet 4-5 mmol/L. Ongoing D5%,0.45%NaCL with 20 oxn13KZs at 75ml/hr. Phosphorus within normal range. Magnesium replaced per protocol and to recheck in the morning.

## 2024-02-14 NOTE — DISCHARGE INSTRUCTIONS
Back-up plan if pump fails:                 Lantus 30 units every 24 hours                Novolog carb coverage: 1 unit per 6 g CHO                Novolog correction scale: 1:25 > 140 three times daily before meals; 1:25 >200 at bedtime.   Pre-meal:   Do Not give Correction Insulin if Pre-Meal BG less than 140.    For Pre-Meal  - 164 give 1 unit.    For Pre-Meal  - 189 give 2 units.    For Pre-Meal  - 214 give 3 units.    For Pre-Meal  - 239 give 4 units.    For Pre-Meal  - 264 give 5 units.    For Pre-Meal  - 289 give 6 units.    For Pre-Meal  - 314 give 7 units.    For Pre-Meal  - 339 give 8 units.    For Pre-Meal  - 364 give 9 units.    For Pre-Meal BG greater than or equal to 365 give 10 units   To be given with prandial insulin, and based on pre-meal blood glucose.       Bedtime:   Do Not give Bedtime Correction Insulin if BG less than 200.    For  - 224 give 1 units.    For  - 249 give 2 units.    For  - 274 give 3 units.    For  - 299 give 4 units.    For  - 324 give 5 units.    For  - 349 give 6 units.    For BG greater than or equal to 350 give 7 units.

## 2024-02-15 ENCOUNTER — TELEPHONE (OUTPATIENT)
Dept: EDUCATION SERVICES | Facility: CLINIC | Age: 24
End: 2024-02-15
Payer: COMMERCIAL

## 2024-02-15 NOTE — TELEPHONE ENCOUNTER
Hello!    There are a couple of things going on with this patient I wanted to address.    1) Contour Next test strips are not covered by the insurance anymore, and needs a prior authorization. Originally, we were going to request new testing supplies, but the patient is concerned that it won't connect with his insulin pump. Can other meters connect to his insulin pump? If so, can you send in new scripts for the meter, test strips, and lancets? If not, a PA needs to be completed per insurance. Below is the insurance information:        2) Insulin glargine (generic) and Lantus are not covered either. Basaglar is covered, but we cannot change it without a new rx. If appropriate, please send in new rx for Basaglar. If Lantus is preferred, below is ins. info:        Thank you so much for your help!  Katt Pretty, [facility] Float [facility]

## 2024-02-16 LAB
BACTERIA BLD CULT: NO GROWTH
BACTERIA BLD CULT: NO GROWTH

## 2024-02-28 NOTE — TELEPHONE ENCOUNTER
Prior Authorization Not Needed per Insurance    Medication: INSULIN GLARGINE  UNIT/ML SC SOPN  Insurance Company: [employer]ty [employer]'s - Phone [phone] Fax [phone]  Expected CoPay: $    Pharmacy Filling the Rx: [facility] [place], [place] - [address]  Pharmacy Notified: y  Patient Notified: y    Pharmacy has paid claim on insulin, patient fills test strips elsewhere through their endo. PA not needed at this time.